# Patient Record
Sex: MALE | Race: WHITE | HISPANIC OR LATINO | Employment: OTHER | URBAN - METROPOLITAN AREA
[De-identification: names, ages, dates, MRNs, and addresses within clinical notes are randomized per-mention and may not be internally consistent; named-entity substitution may affect disease eponyms.]

---

## 2017-04-19 ENCOUNTER — ALLSCRIPTS OFFICE VISIT (OUTPATIENT)
Dept: OTHER | Facility: OTHER | Age: 62
End: 2017-04-19

## 2017-04-19 DIAGNOSIS — E78.5 HYPERLIPIDEMIA: ICD-10-CM

## 2017-04-19 DIAGNOSIS — I25.10 ATHEROSCLEROTIC HEART DISEASE OF NATIVE CORONARY ARTERY WITHOUT ANGINA PECTORIS: ICD-10-CM

## 2017-04-19 DIAGNOSIS — R94.31 ABNORMAL ELECTROCARDIOGRAM: ICD-10-CM

## 2017-04-19 DIAGNOSIS — I10 ESSENTIAL (PRIMARY) HYPERTENSION: ICD-10-CM

## 2017-05-09 ENCOUNTER — TRANSCRIBE ORDERS (OUTPATIENT)
Dept: ADMINISTRATIVE | Facility: HOSPITAL | Age: 62
End: 2017-05-09

## 2017-05-09 DIAGNOSIS — I10 BENIGN HYPERTENSION: Primary | ICD-10-CM

## 2017-05-09 DIAGNOSIS — R94.31 ABNORMAL ELECTROCARDIOGRAM: ICD-10-CM

## 2017-06-08 ENCOUNTER — HOSPITAL ENCOUNTER (OUTPATIENT)
Dept: NON INVASIVE DIAGNOSTICS | Facility: CLINIC | Age: 62
Discharge: HOME | End: 2017-06-08
Payer: COMMERCIAL

## 2017-06-08 DIAGNOSIS — I10 BENIGN HYPERTENSION: ICD-10-CM

## 2017-06-08 DIAGNOSIS — R94.31 ABNORMAL ELECTROCARDIOGRAM: ICD-10-CM

## 2017-06-08 DIAGNOSIS — I10 ESSENTIAL (PRIMARY) HYPERTENSION: ICD-10-CM

## 2017-06-08 DIAGNOSIS — I25.10 ATHEROSCLEROTIC HEART DISEASE OF NATIVE CORONARY ARTERY WITHOUT ANGINA PECTORIS: ICD-10-CM

## 2017-06-08 DIAGNOSIS — E78.5 HYPERLIPIDEMIA: ICD-10-CM

## 2017-06-08 LAB
CHEST PAIN STATEMENT: NORMAL
MAX DIASTOLIC BP: 82 MMHG
MAX HEART RATE: 112 BPM
MAX PREDICTED HEART RATE: 157 BPM
MAX. SYSTOLIC BP: 188 MMHG
PROTOCOL NAME: NORMAL
TARGET HR FORMULA: NORMAL
TEST INDICATION: NORMAL
TIME IN EXERCISE PHASE: 480 S

## 2017-06-08 PROCEDURE — A9502 TC99M TETROFOSMIN: HCPCS

## 2017-06-08 PROCEDURE — 78452 HT MUSCLE IMAGE SPECT MULT: CPT

## 2017-06-08 PROCEDURE — 93017 CV STRESS TEST TRACING ONLY: CPT

## 2018-01-15 VITALS
SYSTOLIC BLOOD PRESSURE: 150 MMHG | DIASTOLIC BLOOD PRESSURE: 60 MMHG | BODY MASS INDEX: 25.23 KG/M2 | HEART RATE: 68 BPM | HEIGHT: 66 IN | WEIGHT: 157 LBS

## 2018-04-04 ENCOUNTER — OFFICE VISIT (OUTPATIENT)
Dept: CARDIOLOGY CLINIC | Facility: MEDICAL CENTER | Age: 63
End: 2018-04-04
Payer: COMMERCIAL

## 2018-04-04 VITALS
BODY MASS INDEX: 25.25 KG/M2 | HEIGHT: 67 IN | WEIGHT: 160.9 LBS | OXYGEN SATURATION: 98 % | SYSTOLIC BLOOD PRESSURE: 116 MMHG | HEART RATE: 68 BPM | DIASTOLIC BLOOD PRESSURE: 64 MMHG

## 2018-04-04 DIAGNOSIS — Z95.1 HX OF CABG: ICD-10-CM

## 2018-04-04 DIAGNOSIS — Z95.5 S/P DRUG ELUTING CORONARY STENT PLACEMENT: ICD-10-CM

## 2018-04-04 DIAGNOSIS — I25.10 CORONARY ARTERY DISEASE INVOLVING NATIVE CORONARY ARTERY OF NATIVE HEART WITHOUT ANGINA PECTORIS: Primary | ICD-10-CM

## 2018-04-04 DIAGNOSIS — I10 ESSENTIAL HYPERTENSION: ICD-10-CM

## 2018-04-04 DIAGNOSIS — E78.5 HYPERLIPIDEMIA, UNSPECIFIED HYPERLIPIDEMIA TYPE: ICD-10-CM

## 2018-04-04 DIAGNOSIS — R94.31 ABNORMAL ECG: ICD-10-CM

## 2018-04-04 PROCEDURE — 99214 OFFICE O/P EST MOD 30 MIN: CPT | Performed by: INTERNAL MEDICINE

## 2018-04-04 RX ORDER — ASPIRIN 81 MG/1
1 TABLET, CHEWABLE ORAL DAILY
COMMUNITY

## 2018-04-04 RX ORDER — ATENOLOL 50 MG/1
50 TABLET ORAL DAILY
Qty: 90 TABLET | Refills: 3 | Status: SHIPPED | OUTPATIENT
Start: 2018-04-04 | End: 2019-11-18 | Stop reason: SDUPTHER

## 2018-04-04 RX ORDER — URSODIOL 300 MG/1
1 CAPSULE ORAL 2 TIMES DAILY
COMMUNITY
Start: 2015-09-14

## 2018-04-04 RX ORDER — LISINOPRIL 20 MG/1
20 TABLET ORAL DAILY
COMMUNITY
Start: 2012-04-18 | End: 2018-06-12 | Stop reason: CLARIF

## 2018-04-04 RX ORDER — ATENOLOL 50 MG/1
1 TABLET ORAL DAILY
COMMUNITY
Start: 2011-06-30 | End: 2018-04-04 | Stop reason: SDUPTHER

## 2018-04-04 RX ORDER — MYCOPHENOLATE MOFETIL 500 MG/1
1000 TABLET ORAL 2 TIMES DAILY
Refills: 0 | COMMUNITY
Start: 2018-03-01

## 2018-04-04 RX ORDER — PREDNISONE 1 MG/1
5 TABLET ORAL DAILY
Refills: 0 | COMMUNITY
Start: 2018-03-14 | End: 2018-10-15 | Stop reason: ALTCHOICE

## 2018-04-04 NOTE — PROGRESS NOTES
Follow-up - Cardiology   Starr Carrasquillo 57 y o  male MRN: [id number]        Problems    1  Coronary artery disease involving native coronary artery of native heart without angina pectoris     2  Hyperlipidemia, unspecified hyperlipidemia type     3  Abnormal ECG     4  Essential hypertension     5  S/P drug eluting coronary stent placement     6  Hx of CABG         Plan    I had the pleasure of having Fredy Becerrils return to see me at the [facility]  He recently came down with pneumonia, significant coughing and some musculoskeletal chest discomfort was likely  Symptoms are atypical for CAD  He had a stress test 6/17 which was normal       CAD   remote history of CABG, and stenting, preserved LV function, stress test 6/17 normal   No recent coronary symptoms  Hypertension   controlled     hyperlipidemia   no statin therapy due to autoimmune hepatitis  Autoimmune hepatitis   remains on immunosuppressive therapy, he reports his disease is stable, but significant rise in his A1c due to prednisone and he tells me that down titration of his prednisone is planned  1 year follow up recommended  HPI: Starr Carrasquillo is a 57y o  year old male   With a history of remote CABG, prior coronary stent, preserved LV function, hypertension which has been well controlled on current medical therapy including atenolol and lisinopril, hyperlipidemia which unfortunately is only treated by lifestyle measures due to autoimmune hepatitis, and autoimmune hepatitis currently on immunosuppressive therapy including prednisone with a recent increase in his A1c 9 9  Prednisone down titration is planned  An J9B of 9 9 is certainly a significant cardiovascular risk  He did have some chest pain recently, but this follows pneumonia, and coughing was felt related to musculoskeletal discomfort by his PCP  He tells me his lipids have been under pretty reasonable control in the absence of statin therapy    He had a stress test 6/17 which revealed normal resolved after 8 minutes of exercise and was prompted by a slightly abnormal EKG in my office last year  Review of Systems   All other systems reviewed and are negative  History reviewed  No pertinent past medical history  History   Alcohol Use No     History   Drug Use No     History   Smoking Status    Former Smoker    Quit date: 2000   Smokeless Tobacco    Never Used       Allergies: Allergies   Allergen Reactions    Imuran [Azathioprine] Fever       Medications:     Current Outpatient Prescriptions:     aspirin 81 mg chewable tablet, Chew 1 tablet daily, Disp: , Rfl:     atenolol (TENORMIN) 50 mg tablet, Take 1 tablet by mouth daily, Disp: , Rfl:     Empagliflozin 25 MG TABS, Take 10 mg by mouth daily, Disp: , Rfl:     lisinopril (ZESTRIL) 20 mg tablet, Take 20 mg by mouth daily, Disp: , Rfl:     mycophenolate (CELLCEPT) 500 mg tablet, Take 1,000 mg by mouth 2 (two) times a day, Disp: , Rfl: 0    predniSONE 5 mg tablet, 7 5 mg daily, Disp: , Rfl: 0    ursodiol (ACTIGALL) 300 mg capsule, Take 1 capsule by mouth 2 (two) times a day, Disp: , Rfl:       Vitals:    04/04/18 1411   BP: 116/64   Pulse: 68   SpO2: 98%     Weight (last 2 days)     Date/Time   Weight    04/04/18 1411  73 (160 9)            Physical Exam   Constitutional: He is oriented to person, place, and time  No distress  HENT:   Head: Normocephalic and atraumatic  Eyes: Conjunctivae are normal  No scleral icterus  Neck: Normal range of motion  No JVD present  Cardiovascular: Normal rate, regular rhythm, normal heart sounds and intact distal pulses  No murmur heard  Pulmonary/Chest: Effort normal and breath sounds normal  He has no wheezes  He has no rales  Musculoskeletal: He exhibits no edema  Neurological: He is alert and oriented to person, place, and time  Skin: Skin is warm and dry  He is not diaphoretic           Laboratory Studies:  CMP:    NT-proBNP: No results found for: NTBNP   Coags:    Lipid Profile:   No results found for: CHOL  No results found for: HDL  No results found for: LDLCALC  No results found for: TRIG    Cardiac testing:      stress Myoview 6/17-8 minutes of exercise, borderline ST segment changes, no ischemic symptoms, no perfusion abnormality, normal ejection fraction    Chung Ross MD    Portions of the record may have been created with voice recognition software   Occasional wrong word or "sound a like" substitutions may have occurred due to the inherent limitations of voice recognition software   Read the chart carefully and recognize, using context, where substitutions have occurred

## 2018-04-23 ENCOUNTER — OFFICE VISIT (OUTPATIENT)
Dept: PULMONOLOGY | Facility: CLINIC | Age: 63
End: 2018-04-23
Payer: COMMERCIAL

## 2018-04-23 VITALS
HEIGHT: 67 IN | HEART RATE: 74 BPM | TEMPERATURE: 97.2 F | OXYGEN SATURATION: 97 % | RESPIRATION RATE: 16 BRPM | DIASTOLIC BLOOD PRESSURE: 70 MMHG | BODY MASS INDEX: 25.27 KG/M2 | SYSTOLIC BLOOD PRESSURE: 128 MMHG | WEIGHT: 161 LBS

## 2018-04-23 DIAGNOSIS — J84.9 ILD (INTERSTITIAL LUNG DISEASE) (HCC): Primary | ICD-10-CM

## 2018-04-23 PROCEDURE — 99244 OFF/OP CNSLTJ NEW/EST MOD 40: CPT | Performed by: INTERNAL MEDICINE

## 2018-04-23 NOTE — PROGRESS NOTES
Office Visit - Pulmonary Medicine   Mateusz Ballard 57 y o  male MRN: [id number]  Encounter: [id number]    Assessment/Plan:    Suspect IPF with or without COPD given bibasilar fibrosis reported as far back as 2014 on CT and CXR however cannot rule out autoimmune or medication related   Patient with good functional status  Obtain CT and PFTs with 6MW to assess the disease and review in clinic  Strongly counselled to quit smoking- patient wants to try on his own cold turkey this week- I have advised nicotine patches if cravings occur      Diagnoses and all orders for this visit:    ILD (interstitial lung disease) ([facility] )  -     CT chest high resolution; Future  -     Pulmonary function test; Future  -     Six minute walk; Future        All of his questions were answered prior to leaving the office today  He will follow-up with Dr Eva Wallace in 1 month  or sooner should the need arise  He is aware to call our office with any further questions or concerns  Subjective:    Reason for Visit: Abnormal CXR  Chief Complaint: None  Hx and PE limited by: Not limited  HPI: Mateusz Ballard is a 57 y o  male who presents to the office today   He was asked to see me because of abnormal CXRs showing bibasilar fibrosis  This goes back to CT of the abdomen in 2014  Jerryl Rm He feels well and is very active able to exercise atleast 5 miles a day and still working  No cough or wheeze or chest pain  Able to lie flat and sleep through the night  No ankle edema or  Palpitations  He is currently managed for autoimmune hepatitis at [facility] and has had immuran, mycophenolate and prednisone in the past but no methotrexate  No history of amiodarone use, chemotherapeutic agents or radiation  No occupational exposures ( 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 [employer] 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[employer])  No family history of ILD  No pet birds       Heavy smoker quit after CABG and resumed smoking last year 1 pack lasts him 2 weeks    Review of Systems     Review of systems-  No fever weight loss chills change in appetite constitutional symptoms  No headache blurry vision nausea vomiting diarrhea or weakness  No chest pain palpitations syncope shortness of breath orthopnea PND or wheezing or lower extremity swelling   no cough no discolored mucus or hemoptysis  No joint pains swelling or redness of an extremity  No hematuria dysuria urgency or frequency  No hematochezia melena or hematemesis  Rest of  ten point review of systems is normal    Historical Information    CAD  HL  Essential HTN  CABG / stenting stress test 6/17 normal , preserved LV function  Autoimmune hepatitis          No past surgical history on file  Social History   History   Alcohol Use No     History   Drug Use No     History   Smoking Status    Former Smoker    Quit date: 2000   Smokeless Tobacco    Never Used     Occupational History:  As above    Family History: non-contributory    Meds/Allergies   all current active meds have been reviewed    Allergies   Allergen Reactions    Imuran [Azathioprine] Fever       Objective   Vitals: Blood pressure 128/70, pulse 74, temperature (!) 97 2 °F (36 2 °C), resp  rate 16, height 5' 6 5" (1 689 m), weight 73 kg (161 lb), SpO2 97 %  ,Body mass index is 25 6 kg/m²  Physical Exam     Appears well in no respiratory distress, thinly built   HEENT: No JVD TM LNE  Cardiac - S1 S2 normal no MRG  Lungs- CTA  Anteriorly velcro bibasilar rales posteriorly   Chest wall inspection normal  Abdomen- soft ND NT normal BS  Neuro- alert oriented no focal neurological deficits  Skin- No rash  Joints- not enlarged red or deformed  Psych- mood and affect normal   LE- no cyanosis clubbing or edema       Lab Results:  None in system    Imaging Studies: I have personally reviewed pertinent reports         CXR from 3/19/18 scanned into system and previous CT in our system on PACS    EKG, Pathology, and Other Studies:  None in system Pulmonary Results (PFTs, PSG):  None in system     Portions of the record may have been created with voice recognition software  Occasional wrong word or "sound a like" substitutions may have occurred due to the inherent limitations of voice recognition software  Read the chart carefully and recognize, using context, where substitutions have occurred

## 2018-04-23 NOTE — PATIENT INSTRUCTIONS
Please attempt to quit smoking  Feel free to use nicotine patch if you have cravings  Continue to stay active and smoke free  We will see you back with Ct and PFTs

## 2018-05-09 ENCOUNTER — HOSPITAL ENCOUNTER (OUTPATIENT)
Dept: PULMONOLOGY | Facility: HOSPITAL | Age: 63
Discharge: HOME | End: 2018-05-09
Attending: INTERNAL MEDICINE
Payer: COMMERCIAL

## 2018-05-09 ENCOUNTER — HOSPITAL ENCOUNTER (OUTPATIENT)
Dept: CT IMAGING | Facility: HOSPITAL | Age: 63
Discharge: HOME | End: 2018-05-09
Attending: INTERNAL MEDICINE
Payer: COMMERCIAL

## 2018-05-09 DIAGNOSIS — J84.9 ILD (INTERSTITIAL LUNG DISEASE) (HCC): ICD-10-CM

## 2018-05-09 PROCEDURE — 94060 EVALUATION OF WHEEZING: CPT

## 2018-05-09 PROCEDURE — 71250 CT THORAX DX C-: CPT

## 2018-05-09 PROCEDURE — 94726 PLETHYSMOGRAPHY LUNG VOLUMES: CPT | Performed by: INTERNAL MEDICINE

## 2018-05-09 PROCEDURE — 94060 EVALUATION OF WHEEZING: CPT | Performed by: INTERNAL MEDICINE

## 2018-05-09 PROCEDURE — 94726 PLETHYSMOGRAPHY LUNG VOLUMES: CPT

## 2018-05-09 PROCEDURE — 94761 N-INVAS EAR/PLS OXIMETRY MLT: CPT

## 2018-05-09 PROCEDURE — 94729 DIFFUSING CAPACITY: CPT

## 2018-05-09 PROCEDURE — 94729 DIFFUSING CAPACITY: CPT | Performed by: INTERNAL MEDICINE

## 2018-05-09 RX ORDER — ALBUTEROL SULFATE 2.5 MG/3ML
2.5 SOLUTION RESPIRATORY (INHALATION) ONCE
Status: COMPLETED | OUTPATIENT
Start: 2018-05-09 | End: 2018-05-09

## 2018-05-09 RX ADMIN — ALBUTEROL SULFATE 2.5 MG: 2.5 SOLUTION RESPIRATORY (INHALATION) at 16:20

## 2018-06-12 ENCOUNTER — OFFICE VISIT (OUTPATIENT)
Dept: PULMONOLOGY | Facility: CLINIC | Age: 63
End: 2018-06-12
Payer: COMMERCIAL

## 2018-06-12 VITALS
TEMPERATURE: 97.5 F | HEIGHT: 66 IN | DIASTOLIC BLOOD PRESSURE: 50 MMHG | WEIGHT: 158 LBS | BODY MASS INDEX: 25.39 KG/M2 | SYSTOLIC BLOOD PRESSURE: 110 MMHG | OXYGEN SATURATION: 95 % | HEART RATE: 63 BPM

## 2018-06-12 DIAGNOSIS — J84.9 INTERSTITIAL LUNG DISEASE (HCC): Primary | ICD-10-CM

## 2018-06-12 PROBLEM — F17.201 TOBACCO ABUSE, IN REMISSION: Status: ACTIVE | Noted: 2018-06-12

## 2018-06-12 PROBLEM — K75.4 AUTOIMMUNE HEPATITIS (HCC): Status: ACTIVE | Noted: 2018-06-05

## 2018-06-12 PROBLEM — J43.8 OTHER EMPHYSEMA (HCC): Status: ACTIVE | Noted: 2018-06-12

## 2018-06-12 PROCEDURE — 99215 OFFICE O/P EST HI 40 MIN: CPT | Performed by: INTERNAL MEDICINE

## 2018-06-12 RX ORDER — CIPROFLOXACIN 500 MG/1
500 TABLET, FILM COATED ORAL
COMMUNITY
End: 2019-07-15 | Stop reason: ALTCHOICE

## 2018-06-12 RX ORDER — METRONIDAZOLE 500 MG/1
500 TABLET ORAL
COMMUNITY
End: 2018-06-12

## 2018-06-12 RX ORDER — LISINOPRIL 5 MG/1
5 TABLET ORAL DAILY
COMMUNITY
End: 2019-07-15 | Stop reason: ALTCHOICE

## 2018-06-12 NOTE — PROGRESS NOTES
Pulmonary Follow Up Note   Jhony Medina 70 y o  male MRN: [id number]  6/12/2018      Assessment:  1  Interstitial Lung Disease  · Undetermined etiology - however appeared present back in 2004  · Suspect autoimmune etiology given autoimmune hepatitis  Time course and radiographic pattern not typical for IPF, possible chronic HP, NSIP, atypical sarcoid, etc  · Moderate restriction on spirometry, mild reduction in TLC with no exertional hypoxia or dyspnea symptoms  · Plan repeat serology evaluation now as listed  · Repeat 6MWT and spirometry in office in 5 months  · Determine timing of repeat chest imaging based upon results - would recommend at least yearly given tobacco abuse history  · Review vaccine status at next visit    2  Emphysema on radiographs - no symptoms, monitor as listed above    3  Former tobacco abuse - strongly encouraged to remain completely tobacco free    4  Autoimmune hepatitis    Plan:    Diagnoses and all orders for this visit:    Interstitial lung disease ([facility] )  -     AN Screen w/ Reflex to Titer/Pattern; Future  -     Anti-neutrophilic cytoplasmic antibody; Future  -     Anti-scleroderma antibody; Future  -     Hypersensitivity pnuemonitis profile; Future  -     RF Screen w/ Reflex to Titer; Future  -     Anti-DNA antibody, double-stranded; Future  -     Cyclic citrul peptide antibody, IgG; Future  -     IgE; Future  -     Sjogren's Antibodies; Future  -     Alpha 1 Antitrypsin Phenotype; Future  -     Alpha-1-antitrypsin; Future    Other orders  -     ciprofloxacin (CIPRO) 500 mg tablet; Take 500 mg by mouth  -     Discontinue: metroNIDAZOLE (FLAGYL) 500 mg tablet; Take 500 mg by mouth  -     lisinopril (ZESTRIL) 5 mg tablet; Take 5 mg by mouth daily        Return in about 5 months (around 11/12/2018) for Recheck  History of Present Illness   HPI:  Jhony Medina is a 70 y o  male who presents for ILD follow up    He has history of autoimmune hepatitis on prednisone/cellcept and prior imuran therapy, CAD post CABG, and prior tobacco abuse  He was seen initially by Dr Moy Coronel and PFTs and HRCT were ordered  He presents today for follow up  He reports feeling well  He continues to walk 5 miles per day and denied any dyspnea  He denied cough, no sputum production, no hemoptysis, no pleurisy,no adenopathy, no weight loss, no arthralgias, no myalgias, no wheeze, no inhaler use, no aspiration events, no dysphagia  He has quit smoking since his last visit    Prior History  - former smoker was 1-2 ppd for many years, quit 2018  - 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  - Prior [address] - multiple animal exposures including 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  - no current animal exposures, no exotic hobbies  Review of Systems   Constitutional: Negative for activity change, chills, fever and unexpected weight change  HENT: Negative for congestion, postnasal drip, rhinorrhea, trouble swallowing and voice change  Eyes: Negative for pain, redness and visual disturbance  Respiratory: Negative for cough, chest tightness, shortness of breath and wheezing  Cardiovascular: Negative for chest pain, palpitations and leg swelling  Gastrointestinal: Negative for abdominal distention, abdominal pain, diarrhea, nausea and vomiting  Endocrine: Negative for cold intolerance and heat intolerance  Genitourinary: Negative for dysuria, frequency and urgency  Musculoskeletal: Negative for arthralgias and myalgias  Skin: Negative for color change, rash and wound  Neurological: Negative for dizziness, syncope, weakness and light-headedness  Hematological: Negative for adenopathy  Psychiatric/Behavioral: Negative for confusion and sleep disturbance         Historical Information   Past Medical History:   Diagnosis Date    [facility]     Diabetes mellitus ([facility] )     Type 2     Pneumonia 2018     Past Surgical History:   Procedure Laterality Date    ARTERIAL BYPASS SURGERY      ROTATOR CUFF REPAIR       Family History   Problem Relation Age of Onset    Heart attack Mother     Heart disease Brother     Hypertension Brother     Heart disease Brother     Heart disease Brother     COPD Brother          Meds/Allergies     Current Outpatient Prescriptions:     aspirin 81 mg chewable tablet, Chew 1 tablet daily, Disp: , Rfl:     atenolol (TENORMIN) 50 mg tablet, Take 1 tablet (50 mg total) by mouth daily, Disp: 90 tablet, Rfl: 3    ciprofloxacin (CIPRO) 500 mg tablet, Take 500 mg by mouth, Disp: , Rfl:     lisinopril (ZESTRIL) 5 mg tablet, Take 5 mg by mouth daily, Disp: , Rfl:     mycophenolate (CELLCEPT) 500 mg tablet, Take 1,000 mg by mouth 2 (two) times a day, Disp: , Rfl: 0    predniSONE 5 mg tablet, 5 mg daily  , Disp: , Rfl: 0    ursodiol (ACTIGALL) 300 mg capsule, Take 1 capsule by mouth 2 (two) times a day, Disp: , Rfl:     Empagliflozin 25 MG TABS, Take 10 mg by mouth daily, Disp: , Rfl:   Allergies   Allergen Reactions    Imuran [Azathioprine] Fever       Vitals: Blood pressure 110/50, pulse 63, temperature 97 5 °F (36 4 °C), temperature source Tympanic, height 5' 6" (1 676 m), weight 71 7 kg (158 lb), SpO2 95 %  Body mass index is 25 5 kg/m²  Oxygen Therapy  SpO2: 95 %  Oxygen Therapy: None (Room air)      Physical Exam  Physical Exam   Constitutional: He is oriented to person, place, and time  He appears well-developed and well-nourished  HENT:   Head: Normocephalic and atraumatic  Right Ear: External ear normal    Left Ear: External ear normal    Nose: Nose normal    Mouth/Throat: Oropharynx is clear and moist  No oropharyngeal exudate  Eyes: Conjunctivae are normal  Pupils are equal, round, and reactive to light  Right eye exhibits no discharge  Left eye exhibits no discharge  No scleral icterus  Neck: Neck supple  No JVD present  No tracheal deviation present  Cardiovascular: Normal rate, regular rhythm and normal heart sounds  No murmur heard  Pulmonary/Chest: Effort normal  No stridor  No respiratory distress  He has no wheezes  He has rales  Very mild holoinspiratory rales on right base, no wheeze, no rhonchi, no pleural rubs   Abdominal: Soft  Bowel sounds are normal  He exhibits no distension  There is no tenderness  Musculoskeletal: He exhibits no edema or deformity  Lymphadenopathy:     He has no cervical adenopathy  Neurological: He is alert and oriented to person, place, and time  Skin: Skin is warm and dry  No rash noted  Psychiatric: He has a normal mood and affect  His behavior is normal    Vitals reviewed  Labs: I have personally reviewed pertinent lab results  Lab Results   Component Value Date    WBC 6 96 2014    HGB 15 8 2014    HCT 44 7 2014    MCV 90 2014     (L) 2014     Lab Results   Component Value Date    GLUCOSE 411 (H) 2014    CALCIUM 9 4 2014     (L) 2014    K 4 0 2014    CO2 29 2014    CL 98 (L) 2014    BUN 10 2014    CREATININE 0 53 (L) 2014     No results found for: IGE  Lab Results   Component Value Date     (H) 2014     (H) 2014    ALKPHOS 67 2014    BILITOT 0 76 2014       Imaging and other studies: I have personally reviewed pertinent reports  and I have personally reviewed pertinent films in PACS  HRCT Chest 18 - noted peripheral honeycomb and peripheral subpleural thickening with noted upper lobe predominance on right and some basilar honeycombing, underlying upper lobe paraseptal emphysematous changes - no effusions, no sig mediastinal adenopathy - mild progression from 2013 radiograph - of note - CT chest  with noted upper lobe paraseptal emphysematous changes,mild honeycombing and more of a basilar peripheral GGO      Pulmonary function testin/9/18 - Ratio 78%, FVC 2 61L (67%), FEV1 2 04L (68%), no change post BD, TLC 73%, RV 89%, DLCO 49% - moderate restrictive airflow defect, mildly reduced TLC, no change with BD, moderately reduced diffusion    5/9/18 - 6MWT on room air - total walk distance 401  8meters, Initial SpO2 97%, tayler 96%, conclusion 97%, maximal HR 87bpm, Brittany dyspnea Scale 0-->0      Loly Rapp DO, [facility]

## 2018-06-12 NOTE — PATIENT INSTRUCTIONS
· Get labs drawn with next hepatology labs  · Follow up in 4-5 months with repeat breathing and walking tests in the office

## 2018-10-12 LAB
A FUMIGATUS1 AB SER QL ID: NEGATIVE
A FUMIGATUS6 AB SER QL ID: NEGATIVE
A PULLULANS AB SER QL: NEGATIVE
A1AT PHENOTYP SERPL IFE: NORMAL
A1AT SERPL-MCNC: 145 MG/DL (ref 90–200)
ANA TITR SER IF: NEGATIVE {TITER}
C-ANCA TITR SER IF: NORMAL TITER
CCP IGA+IGG SERPL IA-ACNC: 7 UNITS (ref 0–19)
DSDNA AB SER-ACNC: 1 IU/ML (ref 0–9)
ENA SCL70 AB SER-ACNC: <0.2 AI (ref 0–0.9)
ENA SS-A AB SER-ACNC: <0.2 AI (ref 0–0.9)
ENA SS-B AB SER-ACNC: <0.2 AI (ref 0–0.9)
IGE SERPL-ACNC: 92 IU/ML (ref 0–100)
P-ANCA ATYPICAL TITR SER IF: NORMAL TITER
P-ANCA TITR SER IF: NORMAL TITER
PIGEON SERUM AB QL ID: NEGATIVE
RHEUMATOID FACT SERPL-ACNC: <10 IU/ML (ref 0–13.9)
S RECTIVIRGULA AB SER QL ID: NEGATIVE
S VIRIDIS AB SER-ACNC: NEGATIVE
T CANDIDUS AB SER QL: NEGATIVE
THERMOACTINOMYCES VULGARIS AB [UNITS/VOLUME] IN SERUM: NEGATIVE

## 2018-10-15 ENCOUNTER — OFFICE VISIT (OUTPATIENT)
Dept: PULMONOLOGY | Facility: CLINIC | Age: 63
End: 2018-10-15
Payer: COMMERCIAL

## 2018-10-15 VITALS
SYSTOLIC BLOOD PRESSURE: 130 MMHG | HEIGHT: 66 IN | TEMPERATURE: 97.1 F | WEIGHT: 162 LBS | RESPIRATION RATE: 18 BRPM | OXYGEN SATURATION: 96 % | DIASTOLIC BLOOD PRESSURE: 68 MMHG | HEART RATE: 65 BPM | BODY MASS INDEX: 26.03 KG/M2

## 2018-10-15 DIAGNOSIS — K75.4 AUTOIMMUNE HEPATITIS (HCC): ICD-10-CM

## 2018-10-15 DIAGNOSIS — Z72.0 TOBACCO ABUSE: ICD-10-CM

## 2018-10-15 DIAGNOSIS — J84.9 INTERSTITIAL LUNG DISEASE (HCC): Primary | ICD-10-CM

## 2018-10-15 PROCEDURE — 94618 PULMONARY STRESS TESTING: CPT | Performed by: INTERNAL MEDICINE

## 2018-10-15 PROCEDURE — 99215 OFFICE O/P EST HI 40 MIN: CPT | Performed by: INTERNAL MEDICINE

## 2018-10-15 PROCEDURE — 94010 BREATHING CAPACITY TEST: CPT | Performed by: INTERNAL MEDICINE

## 2018-10-15 RX ORDER — PREDNISONE 20 MG/1
5 TABLET ORAL DAILY
COMMUNITY

## 2018-10-15 RX ORDER — GLIPIZIDE 10 MG/1
20 TABLET, FILM COATED, EXTENDED RELEASE ORAL DAILY
COMMUNITY

## 2018-10-15 NOTE — PATIENT INSTRUCTIONS
· You need to completely quit smoking  · Follow up in 6 months and get CT chest prior to your visit  · Call for any increased shortness of breath or new chest symptoms    How to Stop Smoking   AMBULATORY CARE:   You will improve your health and the health of others around you  if you stop smoking  Your risk for heart and lung disease, cancer, stroke, heart attack, and vision problems will also decrease  You can benefit from quitting no matter how long you have smoked  Prepare to stop smoking:  Nicotine is a highly addictive drug found in cigarettes  Withdrawal symptoms can happen when you stop smoking and make it hard to quit  These include anxiety, depression, irritability, trouble sleeping, and increased appetite  You increase your chances of success if you prepare to quit  · Set a quit date  [place]al [place] a date that is within the next 2 weeks  Do not pick a day that you think may be stressful or busy  Write down the day or [employer] it on your calender  · Tell friends and family that you plan to quit  Explain that you may have withdrawal symptoms when you try to quit  Ask them to support you  They may be able to encourage you and help reduce your stress to make it easier for you to quit  · Make a list of your reasons for quitting  Put the list somewhere you will see it every day, such as your refrigerator  You can look at the list when you have a craving  · Remove all tobacco and nicotine products from your home, car, and workplace  Also, remove anything else that will tempt you to smoke, such as lighters, matches, or ashtrays  Clean your car, home, and places at work that smell like smoke  The smell of smoke can trigger a craving  · Identify triggers that make you want to smoke  This may include activities, feelings, or people  Also write down 1 way you can deal with each of your triggers   For example, if you want to smoke as soon as you wake up, plan another activity during this time, such as exercise  · Make a plan for how you will quit  Learn about the tools that can help you quit, such as medicine, counseling, or nicotine replacement therapy  Choose at least 2 options to help you quit  Tools to help you stop smoking:   · Counseling  from a trained healthcare provider can provide you with support and skills to quit smoking  The provider will also teach you to manage your withdrawal symptoms and cravings  You may receive counseling from one counselor, in group therapy, or through phone therapy called a quit line  · Nicotine replacement therapy (NRT)  such as nicotine patches, gum, or lozenges may help reduce your nicotine cravings  You may get these without a doctor's order  Do not use e-cigarettes or smokeless tobacco in place of cigarettes or to help you quit  They still contain nicotine  · Prescription medicines  such as nasal sprays or nicotine inhalers may help reduce your withdrawal symptoms  Other medicines may also be used to reduce your urge to smoke  Ask your healthcare provider about these medicines  You may need to start certain medicines 2 weeks before your quit date for them to work well  · Hypnosis  is a practice that helps guide you through thoughts and feelings  Hypnosis may help decrease your cravings and make you more willing to quit  · Acupuncture therapy  uses very thin needles to balance energy channels in the body  This is thought to help decrease cravings and symptoms of nicotine withdrawal      · Support groups  let you talk to others who are trying to quit or have already quit  It may be helpful to speak with others about how they quit  Manage your cravings:   · Avoid situations, people, and places that tempt you to smoke  Go to nonsmoking places, such as libraries or restaurants  Understand what tempts you and try to avoid these things  · Keep your hands busy  Hold things such as a stress ball or pen  · Put candy or toothpicks in your mouth  Keep lollipops, sugarless gum, or toothpicks with you at all times  · Do not have alcohol or caffeine  These drinks may tempt you to smoke  Drink healthy liquids such as water or juice instead  · Reward yourself when you resist your cravings  Rewards will motivate you and help you stay positive  · Do an activity that distracts you from your craving  Examples include going for a walk, exercising, or cleaning  Prevent weight gain after you quit:  You may gain a few pounds after you quit smoking  It is healthier for you to gain a few pounds than to continue to smoke  The following can help you prevent weight gain:  · Eat healthy foods  These include fruits, vegetables, whole-grain breads, low-fat dairy products, beans, lean meats, and fish  Eat healthy snacks, such as low-fat yogurt, if you get hungry between meals  · Drink water before, during, and between meals  This will make your stomach feel full and help prevent you from overeating  Ask your healthcare provider how much liquid to drink each day and which liquids are best for you  · Exercise  Take a walk or do some kind of exercise every day  Ask your healthcare provider what exercise is right for you  This may help reduce your cravings and reduce stress  For more support and information:   · Smokefree  gov  Phone: 7- 848 - 538-5037  Web Address: www smokefree  gov  © 2017 [address] Information is for End User's use only and may not be sold, redistributed or otherwise used for commercial purposes  All illustrations and images included in CareNotes® are the copyrighted property of [employer]  or Sumit Reveles  The above information is an  only  It is not intended as medical advice for individual conditions or treatments  Talk to your doctor, nurse or pharmacist before following any medical regimen to see if it is safe and effective for you

## 2018-10-15 NOTE — PROGRESS NOTES
Pulmonary Follow Up Note   Raul Whitaker 70 y o  male MRN: [id number]  10/15/2018      Assessment:  1  Interstitial Lung Disease  · Undetermined etiology - however appeared present back in 2004  · Continue to suspect autoimmune etiology given autoimmune hepatitis  Time course and radiographic pattern not typical for IPF  · Serology panels negative 10/2018  · Moderate restriction on spirometry, mild reduction in TLC with no exertional hypoxia or dyspnea symptoms  · Discussed that current treatment with cellcept would be in concordance with non IPF ILD  · Repeat 6MWT and spirometry in office in 6 months  · Repeat HRCT in 6 months  · He reports he has obtained flu vaccine this season and previously had pneumovax    2  Emphysema on radiographs - no symptoms, monitor as listed above    3  Tobacco abuse - again advised need for complete tobacco cessation  Nonpharmacologic adjuncts and methods discussed  Could consider nicotine lozenges or gum for urges    4  Autoimmune hepatitis - per gastroenterology    Plan:    Diagnoses and all orders for this visit:    Interstitial lung disease ([facility] )  -     Six minute walk  -     POCT spirometry  -     CT chest high resolution; Future    Autoimmune hepatitis ([facility] )    Tobacco abuse    Other orders  -     predniSONE 10 mg tablet; Take 30 mg by mouth daily  -     glipiZIDE (GLUCOTROL XL) 10 mg 24 hr tablet; Take 20 mg by mouth daily  -     Empagliflozin (JARDIANCE) 10 MG TABS; Take 20 mg by mouth every morning        Return in about 6 months (around 4/15/2019) for Recheck  History of Present Illness   HPI:  Raul Whitaker is a 70 y o  male who presents for ILD follow up  He has history of autoimmune hepatitis on prednisone/cellcept and prior imuran therapy, CAD post CABG, and prior tobacco abuse  He was seen initially by Dr Eleazar Velazco and PFTs and HRCT were ordered  He was last seen by me in June 2018 and plans for serology testing were made        He reports he has overall felt well  He denied any changes in dyspnea and actually reported being able to do more than at last visit  He denied cough or sputum production, no hemoptysis, no pleurisy,no arthralgias, no myalgias, no rashes  He did report to smoking 3 cigarettes a day but has plan to wean off fully by the end of the month  He has continued on cellcept and started prednisone last week by Dr Chester Tompkins - BALJIT for his autoimmune hepatitis  Prior History  - former smoker was 1-2 ppd for many years, now 2-3 cig per day  - 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  - Prior [address] - multiple animal exposures including 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  - no current animal exposures, no exotic hobbies  Review of Systems   Constitutional: Negative for activity change, chills, fever and unexpected weight change  HENT: Negative for congestion, postnasal drip, rhinorrhea, trouble swallowing and voice change  Eyes: Negative for pain, redness and visual disturbance  Respiratory: Negative for cough, chest tightness, shortness of breath and wheezing  Cardiovascular: Negative for chest pain, palpitations and leg swelling  Gastrointestinal: Negative for abdominal distention, abdominal pain, diarrhea, nausea and vomiting  Endocrine: Negative for cold intolerance and heat intolerance  Genitourinary: Negative for dysuria, frequency, hematuria and urgency  Musculoskeletal: Negative for arthralgias and myalgias  Skin: Negative for color change and wound  Allergic/Immunologic: Positive for immunocompromised state  Neurological: Negative for dizziness, syncope, weakness and light-headedness  Hematological: Negative for adenopathy  Psychiatric/Behavioral: Negative for confusion and sleep disturbance         Historical Information   Past Medical History:   Diagnosis Date    Autoimmune hepatitis ([facility] )     Diabetes mellitus ([facility] ) Type 2     Pneumonia 2018     Past Surgical History:   Procedure Laterality Date    ARTERIAL BYPASS SURGERY      ROTATOR CUFF REPAIR       Family History   Problem Relation Age of Onset    Heart attack Mother     Heart disease Brother     Hypertension Brother     Heart disease Brother     Heart disease Brother     COPD Brother          Meds/Allergies     Current Outpatient Prescriptions:     aspirin 81 mg chewable tablet, Chew 1 tablet daily, Disp: , Rfl:     atenolol (TENORMIN) 50 mg tablet, Take 1 tablet (50 mg total) by mouth daily, Disp: 90 tablet, Rfl: 3    Empagliflozin (JARDIANCE) 10 MG TABS, Take 20 mg by mouth every morning, Disp: , Rfl:     glipiZIDE (GLUCOTROL XL) 10 mg 24 hr tablet, Take 20 mg by mouth daily, Disp: , Rfl:     lisinopril (ZESTRIL) 5 mg tablet, Take 5 mg by mouth daily, Disp: , Rfl:     mycophenolate (CELLCEPT) 500 mg tablet, Take 1,000 mg by mouth 2 (two) times a day, Disp: , Rfl: 0    predniSONE 10 mg tablet, Take 30 mg by mouth daily, Disp: , Rfl:     ursodiol (ACTIGALL) 300 mg capsule, Take 1 capsule by mouth 2 (two) times a day, Disp: , Rfl:     ciprofloxacin (CIPRO) 500 mg tablet, Take 500 mg by mouth, Disp: , Rfl:   Allergies   Allergen Reactions    Imuran [Azathioprine] Fever and Myalgia       Vitals: Blood pressure 130/68, pulse 65, temperature (!) 97 1 °F (36 2 °C), temperature source Tympanic, resp  rate 18, height 5' 6" (1 676 m), weight 73 5 kg (162 lb), SpO2 96 %  Body mass index is 26 15 kg/m²  Oxygen Therapy  SpO2: 96 % (ROOM AIR)      Physical Exam  Physical Exam   Constitutional: He is oriented to person, place, and time  He appears well-developed and well-nourished  HENT:   Head: Normocephalic and atraumatic  Right Ear: External ear normal    Left Ear: External ear normal    Nose: Nose normal    Mouth/Throat: Oropharynx is clear and moist  No oropharyngeal exudate  Eyes: Pupils are equal, round, and reactive to light   Conjunctivae are normal  Right eye exhibits no discharge  Left eye exhibits no discharge  No scleral icterus  Neck: Neck supple  No JVD present  No tracheal deviation present  Cardiovascular: Normal rate, regular rhythm and normal heart sounds  No murmur heard  Pulmonary/Chest: Effort normal  No stridor  No respiratory distress  He has no wheezes  He has rales  trace holoinspiratory rales on right base, no wheeze, no rhonchi, no pleural rubs   Abdominal: Soft  Bowel sounds are normal  He exhibits no distension  There is no tenderness  Musculoskeletal: He exhibits no edema or deformity  Lymphadenopathy:     He has no cervical adenopathy  Neurological: He is alert and oriented to person, place, and time  Skin: Skin is warm and dry  No rash noted  Psychiatric: He has a normal mood and affect  His behavior is normal    Vitals reviewed  Labs: I have personally reviewed pertinent lab results  Lab Results   Component Value Date    WBC 6 96 05/28/2014    HGB 15 8 05/28/2014    HCT 44 7 05/28/2014    MCV 90 05/28/2014     (L) 05/28/2014     Lab Results   Component Value Date    GLUCOSE 411 (H) 05/28/2014    CALCIUM 9 4 05/28/2014     (L) 05/28/2014    K 4 0 05/28/2014    CO2 29 05/28/2014    CL 98 (L) 05/28/2014    BUN 10 05/28/2014    CREATININE 0 53 (L) 05/28/2014     No results found for: IGE  Lab Results   Component Value Date     (H) 05/28/2014     (H) 05/28/2014    ALKPHOS 67 05/28/2014    BILITOT 0 76 05/28/2014     Care Everywhere labs reviewed LVH - AST 42, ALT 67    10/8/18 - [facility] Testing  RF, CCP, SCL70, AN, DS DNA, SSa, SSb, ANCA panel, and HP panel negative  IgE 92  A1AT 145, MM phenotype    Imaging and other studies: I have personally reviewed pertinent reports     and I have personally reviewed pertinent films in PACS  HRCT Chest 5/9/18 - noted peripheral honeycomb and peripheral subpleural thickening with noted upper lobe predominance on right and some basilar honeycombing, underlying upper lobe paraseptal emphysematous changes - no effusions, no sig mediastinal adenopathy - mild progression from 2013 radiograph - of note - CT chest 2004 with noted upper lobe paraseptal emphysematous changes,mild honeycombing and more of a basilar peripheral GGO  Pulmonary function testing:   10/15/18 - Ratio 71%, FVC 2 76L (70%), FEV1 1 97L (67%) - mild restrictive airflow defect    5/9/18 - Ratio 78%, FVC 2 61L (67%), FEV1 2 04L (68%), no change post BD, TLC 73%, RV 89%, DLCO 49% - moderate restrictive airflow defect, mildly reduced TLC, no change with BD, moderately reduced diffusion    10/15/18 - 6MWT on RA - total walk distance 414 meters, initial SpP2 97%, tayler 96%, initial HR 77bpm, maximal HR 88bpm    5/9/18 - 6MWT on room air - total walk distance 401  8meters, Initial SpO2 97%, tayler 96%, conclusion 97%, maximal HR 87bpm, Brittany dyspnea Scale 0-->0      Katya Rapp [facility]

## 2018-12-15 LAB — HBA1C MFR BLD HPLC: 10 %

## 2019-01-07 ENCOUNTER — TELEPHONE (OUTPATIENT)
Dept: CARDIOLOGY CLINIC | Facility: CLINIC | Age: 64
End: 2019-01-07

## 2019-01-07 NOTE — TELEPHONE ENCOUNTER
I doubt either are involved, but inform him that I do not mind if he skips atenolol for 4-5 days, and assesses whether or not it is causing his symptoms  If it does not help, then have him restart the atenolol, and stop the lisinopril for 4 to 5 days and assess whether or not it improves the symptoms   A little trial and ever never hurt

## 2019-01-07 NOTE — TELEPHONE ENCOUNTER
Melvi Cranker last seen in [facility], and is a one year f/u  He called today, asking to speak with you  He is taking lisinopril and atenolol and thinks one or both of them are giving him stomach cramps  From what I can see, has been taking both for about two years  He said he has had the stomach cramps for months , and no one is able to figure out why, so he looked up side effects of meds  I told him you were rounding in the hospital, but he asked to speak to you  Please advise      Pt cb: [phone]

## 2019-01-21 ENCOUNTER — TELEPHONE (OUTPATIENT)
Dept: CARDIOLOGY CLINIC | Facility: MEDICAL CENTER | Age: 64
End: 2019-01-21

## 2019-01-23 DIAGNOSIS — Z95.5 S/P DRUG ELUTING CORONARY STENT PLACEMENT: Primary | ICD-10-CM

## 2019-01-23 RX ORDER — WEIGH SCALE
MISCELLANEOUS MISCELLANEOUS WEEKLY
Qty: 1 EACH | Refills: 0 | Status: SHIPPED | OUTPATIENT
Start: 2019-01-23

## 2019-04-03 ENCOUNTER — TELEPHONE (OUTPATIENT)
Dept: CARDIOLOGY CLINIC | Facility: MEDICAL CENTER | Age: 64
End: 2019-04-03

## 2019-04-12 ENCOUNTER — TELEPHONE (OUTPATIENT)
Dept: PULMONOLOGY | Facility: CLINIC | Age: 64
End: 2019-04-12

## 2019-04-23 ENCOUNTER — TELEPHONE (OUTPATIENT)
Dept: PULMONOLOGY | Facility: CLINIC | Age: 64
End: 2019-04-23

## 2019-05-02 ENCOUNTER — HOSPITAL ENCOUNTER (OUTPATIENT)
Dept: CT IMAGING | Facility: HOSPITAL | Age: 64
Discharge: HOME | End: 2019-05-02
Attending: INTERNAL MEDICINE
Payer: COMMERCIAL

## 2019-05-02 DIAGNOSIS — J84.9 INTERSTITIAL LUNG DISEASE (HCC): ICD-10-CM

## 2019-05-02 PROCEDURE — 71250 CT THORAX DX C-: CPT

## 2019-07-15 ENCOUNTER — OFFICE VISIT (OUTPATIENT)
Dept: CARDIOLOGY CLINIC | Facility: MEDICAL CENTER | Age: 64
End: 2019-07-15
Payer: COMMERCIAL

## 2019-07-15 VITALS
BODY MASS INDEX: 25.38 KG/M2 | HEART RATE: 83 BPM | SYSTOLIC BLOOD PRESSURE: 142 MMHG | DIASTOLIC BLOOD PRESSURE: 66 MMHG | HEIGHT: 66 IN | OXYGEN SATURATION: 94 % | WEIGHT: 157.9 LBS

## 2019-07-15 DIAGNOSIS — I10 BENIGN ESSENTIAL HYPERTENSION: ICD-10-CM

## 2019-07-15 DIAGNOSIS — E78.5 HYPERLIPIDEMIA, UNSPECIFIED HYPERLIPIDEMIA TYPE: ICD-10-CM

## 2019-07-15 DIAGNOSIS — Z95.5 S/P DRUG ELUTING CORONARY STENT PLACEMENT: ICD-10-CM

## 2019-07-15 DIAGNOSIS — Z95.1 HX OF CABG: ICD-10-CM

## 2019-07-15 DIAGNOSIS — I25.10 CORONARY ARTERY DISEASE INVOLVING NATIVE CORONARY ARTERY OF NATIVE HEART WITHOUT ANGINA PECTORIS: Primary | ICD-10-CM

## 2019-07-15 DIAGNOSIS — I10 ESSENTIAL HYPERTENSION: ICD-10-CM

## 2019-07-15 PROCEDURE — 93000 ELECTROCARDIOGRAM COMPLETE: CPT | Performed by: INTERNAL MEDICINE

## 2019-07-15 PROCEDURE — 99214 OFFICE O/P EST MOD 30 MIN: CPT | Performed by: INTERNAL MEDICINE

## 2019-07-15 NOTE — PROGRESS NOTES
Follow-up - Cardiology   Berenice Muse 75 y o  male MRN: [id number]        Problems    1  Coronary artery disease involving native coronary artery of native heart without angina pectoris  POCT ECG   2  Essential hypertension  POCT ECG   3  Hyperlipidemia, unspecified hyperlipidemia type     4  Hx of CABG     5  Benign essential hypertension     6  S/P drug eluting coronary stent placement         Plan    I had the pleasure of having Mago Stauffer return to see me at the [facility]  He had lower chest discomfort in the spring, turned out it was probably his gallbladder  He changed his diet, his symptoms have resolved  He had no exertional components, and was very atypical for a cardiac presentation  CAD  Remote history of CABG and stenting  Normal LV function  No active ischemic symptoms  Last stress test 6/17 was normal      Hypertension  Slightly elevated on today's visit, now off of lisinopril since developing intolerance in the spring of 2019, but he assures me it every other doctor's office he has had normal blood pressures  hyperlipidemia  No statin therapy due to autoimmune hepatitis      Autoimmune hepatitis  Continues on immunosuppressive therapy, follows closely with hepatology    I will continue an annual visit with him, no medication changes at this time, but will obtain his lipid results from [facility]      HPI: Berenice Muse is a 75y o  year old male   With a history of remote CABG, prior coronary stent, preserved LV function, hypertension, hyperlipidemia, autoimmune hepatituis presents for a follow-up visit  He had atypical chest pain that last today hours 4/19, ultimately felt this was is gallbladder as there was some reproducibility  There was no exertional component, he has continued to work at his [employer], 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  His EKG today shows sinus rhythm to sinus tachycardia, nonspecific ST and T-wave abnormalities    He currently denies any cardiac symptoms  He feels quite well  He had some abdominal cramping and pain, turned out after eliminating lisinopril it resolved, therefore listing as an allergy  He continues on atenolol was slightly elevated blood pressure today, but insists it is normal at his other doctor's offices  Review of Systems      Past Medical History:   Diagnosis Date    [facility] )     Diabetes mellitus ([facility] )     Type 2     Pneumonia      Social History     Substance and Sexual Activity   Alcohol Use No     Social History     Substance and Sexual Activity   Drug Use No     Social History     Tobacco Use   Smoking Status Former Smoker    Packs/day: 0 25    Years: 35 00    Pack years: 8 75    Types: Cigarettes    Last attempt to quit: 2018    Years since quittin 2   Smokeless Tobacco Never Used   Tobacco Comment    STILL SMOKING ABOUT 3 CIGARETTES A DAY        Allergies:   Allergies   Allergen Reactions    Imuran [Azathioprine] Fever and Myalgia    Lisinopril Abdominal Pain       Medications:     Current Outpatient Medications:     aspirin 81 mg chewable tablet, Chew 1 tablet daily, Disp: , Rfl:     atenolol (TENORMIN) 50 mg tablet, Take 1 tablet (50 mg total) by mouth daily, Disp: 90 tablet, Rfl: 3    Blood Pressure Monitoring (BLOOD PRESSURE MONITOR/L CUFF) MISC, by Does not apply route once a week, Disp: 1 each, Rfl: 0    Empagliflozin (JARDIANCE) 10 MG TABS, Take 20 mg by mouth every morning, Disp: , Rfl:     glipiZIDE (GLUCOTROL XL) 10 mg 24 hr tablet, Take 20 mg by mouth daily, Disp: , Rfl:     mycophenolate (CELLCEPT) 500 mg tablet, Take 1,000 mg by mouth 2 (two) times a day, Disp: , Rfl: 0    predniSONE 20 mg tablet, Take 20 mg by mouth daily , Disp: , Rfl:     ursodiol (ACTIGALL) 300 mg capsule, Take 1 capsule by mouth 2 (two) times a day, Disp: , Rfl:       Vitals:    07/15/19 1437   BP: 142/66   Pulse: 83   SpO2: 94%     Weight (last 2 days) Date/Time   Weight    07/15/19 1437   71 6 (157 9)            Physical Exam   Constitutional: He is oriented to person, place, and time  No distress  HENT:   Head: Normocephalic and atraumatic  Eyes: Conjunctivae are normal  No scleral icterus  Neck: Normal range of motion  No JVD present  Cardiovascular: Normal rate, regular rhythm, normal heart sounds and intact distal pulses  No murmur heard  Pulmonary/Chest: Effort normal and breath sounds normal  No respiratory distress  He has no decreased breath sounds  He has no wheezes  He has no rhonchi  He has no rales  Musculoskeletal: He exhibits no edema  Right lower leg: Normal  He exhibits no edema  Left lower leg: Normal  He exhibits no edema  Neurological: He is alert and oriented to person, place, and time  Skin: Skin is warm and dry  He is not diaphoretic  Laboratory Studies:  CMP:      Invalid input(s): ALBUMIN  NT-proBNP: No results found for: NTBNP   Coags:    Lipid Profile:   No results found for: CHOL  No results found for: HDL  No results found for: LDLCALC  No results found for: TRIG    Cardiac testing:     ECG personally reviewed - Sinus tachycardia      stress Myoview 6/17-8 minutes of exercise, borderline ST segment changes, no ischemic symptoms, no perfusion abnormality, normal ejection fraction    Hetal Mann MD    Portions of the record may have been created with voice recognition software   Occasional wrong word or "sound a like" substitutions may have occurred due to the inherent limitations of voice recognition software   Read the chart carefully and recognize, using context, where substitutions have occurred

## 2019-11-18 DIAGNOSIS — I25.10 CORONARY ARTERY DISEASE INVOLVING NATIVE CORONARY ARTERY OF NATIVE HEART WITHOUT ANGINA PECTORIS: ICD-10-CM

## 2019-11-18 RX ORDER — ATENOLOL 50 MG/1
50 TABLET ORAL DAILY
Qty: 90 TABLET | Refills: 3 | Status: SHIPPED | OUTPATIENT
Start: 2019-11-18 | End: 2020-09-23 | Stop reason: SDUPTHER

## 2019-11-26 ENCOUNTER — TELEPHONE (OUTPATIENT)
Dept: GASTROENTEROLOGY | Facility: CLINIC | Age: 64
End: 2019-11-26

## 2019-11-26 NOTE — TELEPHONE ENCOUNTER
Dr Ivette Chapa - patient's PCP, Dr Leif Wrad requested patient's colonoscopy be faxed - Task completed   Fax [phone]   Sent to [facility] for chart inclusion

## 2020-08-19 ENCOUNTER — TELEPHONE (OUTPATIENT)
Dept: CARDIOLOGY CLINIC | Facility: CLINIC | Age: 65
End: 2020-08-19

## 2020-08-19 DIAGNOSIS — I10 ESSENTIAL HYPERTENSION: ICD-10-CM

## 2020-08-19 DIAGNOSIS — E78.5 HYPERLIPIDEMIA, UNSPECIFIED HYPERLIPIDEMIA TYPE: Primary | ICD-10-CM

## 2020-08-19 DIAGNOSIS — I25.10 CORONARY ARTERY DISEASE INVOLVING NATIVE CORONARY ARTERY OF NATIVE HEART WITHOUT ANGINA PECTORIS: ICD-10-CM

## 2020-08-19 NOTE — TELEPHONE ENCOUNTER
P/C asking for script to complete BW prior to OV  Pt does not recall having any BW completed recently, also checked care everywhere   Labs ordered and placed in mail

## 2020-09-23 ENCOUNTER — OFFICE VISIT (OUTPATIENT)
Dept: CARDIOLOGY CLINIC | Facility: MEDICAL CENTER | Age: 65
End: 2020-09-23
Payer: COMMERCIAL

## 2020-09-23 VITALS
SYSTOLIC BLOOD PRESSURE: 134 MMHG | OXYGEN SATURATION: 97 % | WEIGHT: 159.9 LBS | BODY MASS INDEX: 25.1 KG/M2 | DIASTOLIC BLOOD PRESSURE: 70 MMHG | TEMPERATURE: 96.1 F | HEIGHT: 67 IN | HEART RATE: 65 BPM

## 2020-09-23 DIAGNOSIS — K75.4 AUTOIMMUNE HEPATITIS (HCC): ICD-10-CM

## 2020-09-23 DIAGNOSIS — Z95.5 S/P DRUG ELUTING CORONARY STENT PLACEMENT: ICD-10-CM

## 2020-09-23 DIAGNOSIS — J84.9 INTERSTITIAL LUNG DISEASE (HCC): ICD-10-CM

## 2020-09-23 DIAGNOSIS — Z95.1 HX OF CABG: ICD-10-CM

## 2020-09-23 DIAGNOSIS — I10 BENIGN ESSENTIAL HYPERTENSION: ICD-10-CM

## 2020-09-23 DIAGNOSIS — I25.10 CORONARY ARTERY DISEASE INVOLVING NATIVE CORONARY ARTERY OF NATIVE HEART WITHOUT ANGINA PECTORIS: Primary | ICD-10-CM

## 2020-09-23 DIAGNOSIS — E78.5 HYPERLIPIDEMIA, UNSPECIFIED HYPERLIPIDEMIA TYPE: ICD-10-CM

## 2020-09-23 PROCEDURE — 93000 ELECTROCARDIOGRAM COMPLETE: CPT | Performed by: INTERNAL MEDICINE

## 2020-09-23 PROCEDURE — 99214 OFFICE O/P EST MOD 30 MIN: CPT | Performed by: INTERNAL MEDICINE

## 2020-09-23 RX ORDER — ATENOLOL 50 MG/1
50 TABLET ORAL DAILY
Qty: 90 TABLET | Refills: 3 | Status: SHIPPED | OUTPATIENT
Start: 2020-09-23 | End: 2021-09-07

## 2020-09-23 RX ORDER — MULTIVIT-MIN/IRON FUM/FOLIC AC 7.5 MG-4
1 TABLET ORAL DAILY
COMMUNITY

## 2020-09-23 NOTE — PROGRESS NOTES
Follow-up - Cardiology   Gabriel Jacobson 77 y o  male MRN: [id number]        Problems    1  Coronary artery disease involving native coronary artery of native heart without angina pectoris  POCT ECG    atenolol (TENORMIN) 50 mg tablet    Lipid panel   2  Benign essential hypertension  POCT ECG   3  Hyperlipidemia, unspecified hyperlipidemia type     4  Autoimmune hepatitis ([facility] )     5  Hx of CABG     6  Interstitial lung disease ([facility] )     7  S/P drug eluting coronary stent placement         Plan    I had the pleasure of having Ariel Tse return to see me at the [facility]  CAD  · Remote CABG and stenting  · Normal LV function  · Last stress test normal 6/17  · He offers me no new cardiac symptoms    Hypertension  · Blood pressure well controlled  · He takes atenolol    Hyperlipidemia  · No statin therapy due to autoimmune hepatitis  · , total 195    Autoimmune hepatitis  · Continues to follow with hepatology, and on immunosuppressive therapy, with CellCept, prednisone    PLAN:    No changes for today  Continue annual follow-up  Continues on immunosuppressive therapy for his autoimmune hepatitis  Has coexisting order immune interstitial lung disease, present by imaging since 2000, p r n  Follow-up with his pulmonologist as of visit 2 years ago  He has had no worsening symptoms      HPI: Gabriel Jacobson is a 77y o  year old male   With remote CABG, remote coronary stent, preserved LV function, hypertension, hyperlipidemia, autoimmune hepatituis and 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 presents for a follow-up visit  He has had no progression of any CAD symptoms  Last stress test was normal in 2017, EKG today shows no signs of ischemia, same septal infarct pattern  Hypertension is well controlled, he only takes atenolol  Lipids are reasonable considering no statin therapy in the presence of autoimmune hepatitis      He remains on immunosuppressive therapy, CellCept and prednisone, also taking Ursodiol  Has not had any progression in his interstitial lung disease, from a symptoms standpoint  So pulmonary about 2 years ago  He has diabetes, he takes glipizide and Jardiance  Review of Systems   Constitutional: Negative for appetite change, diaphoresis, fatigue and fever  Respiratory: Negative for chest tightness, shortness of breath and wheezing  Cardiovascular: Negative for chest pain, palpitations and leg swelling  Gastrointestinal: Negative for abdominal pain and blood in stool  Musculoskeletal: Negative for arthralgias and joint swelling  Skin: Negative for rash  Neurological: Negative for dizziness, syncope and light-headedness  Past Medical History:   Diagnosis Date    [facility]     [facility]     Type 2     Pneumonia      Social History     Substance and Sexual Activity   Alcohol Use No     Social History     Substance and Sexual Activity   Drug Use No     Social History     Tobacco Use   Smoking Status Former Smoker    Packs/day: 0 25    Years: 35 00    Pack years: 8 75    Types: Cigarettes    Last attempt to quit: 2018    Years since quittin 4   Smokeless Tobacco Never Used   Tobacco Comment    STILL SMOKING ABOUT 3 CIGARETTES A DAY        Allergies:   Allergies   Allergen Reactions    Imuran [Azathioprine] Fever and Myalgia    Lisinopril Abdominal Pain       Medications:     Current Outpatient Medications:     aspirin 81 mg chewable tablet, Chew 1 tablet daily, Disp: , Rfl:     atenolol (TENORMIN) 50 mg tablet, Take 1 tablet (50 mg total) by mouth daily, Disp: 90 tablet, Rfl: 3    Blood Pressure Monitoring (BLOOD PRESSURE MONITOR/L CUFF) MISC, by Does not apply route once a week, Disp: 1 each, Rfl: 0    Empagliflozin (JARDIANCE) 10 MG TABS, Take 20 mg by mouth every morning, Disp: , Rfl:     glipiZIDE (GLUCOTROL XL) 10 mg 24 hr tablet, Take 20 mg by mouth daily, Disp: , Rfl:     Multiple Vitamins-Minerals (multivitamin with minerals) tablet, Take 1 tablet by mouth daily, Disp: , Rfl:     mycophenolate (CELLCEPT) 500 mg tablet, Take 1,000 mg by mouth 2 (two) times a day, Disp: , Rfl: 0    predniSONE 20 mg tablet, Take 5 mg by mouth daily , Disp: , Rfl:     ursodiol (ACTIGALL) 300 mg capsule, Take 1 capsule by mouth 2 (two) times a day, Disp: , Rfl:       Vitals:    09/23/20 1112   BP: 134/70   Pulse: 65   Temp: (!) 96 1 °F (35 6 °C)   SpO2: 97%     Weight (last 2 days)     Date/Time   Weight    09/23/20 1112   72 5 (159 9)            Physical Exam  Constitutional:       General: He is not in acute distress  Appearance: He is not diaphoretic  HENT:      Head: Normocephalic and atraumatic  Eyes:      General: No scleral icterus  Conjunctiva/sclera: Conjunctivae normal    Neck:      Musculoskeletal: Normal range of motion  Vascular: No JVD  Cardiovascular:      Rate and Rhythm: Normal rate and regular rhythm  Heart sounds: Normal heart sounds  No murmur  Pulmonary:      Effort: Pulmonary effort is normal  No respiratory distress  Breath sounds: Normal breath sounds  No decreased breath sounds, wheezing, rhonchi or rales  Musculoskeletal:      Right lower leg: Normal  No edema  Left lower leg: Normal  No edema  Skin:     General: Skin is warm and dry  Neurological:      Mental Status: He is alert and oriented to person, place, and time             Laboratory Studies:  CMP:      Invalid input(s): ALBUMIN  NT-proBNP: No results found for: NTBNP   Coags:    Lipid Profile:   No results found for: CHOL  No results found for: HDL  No results found for: LDLCALC  No results found for: TRIG    Cardiac testing:     ECG personally reviewed  9/20-septal infarct, sinus rhythm     stress Myoview   6/17 -   8 minutes of exercise, borderline ST segment changes, no ischemic symptoms, no perfusion abnormality, normal ejection fraction    Isac Marie MD    Portions of the record may have been created with voice recognition software   Occasional wrong word or "sound a like" substitutions may have occurred due to the inherent limitations of voice recognition software   Read the chart carefully and recognize, using context, where substitutions have occurred

## 2021-09-03 DIAGNOSIS — I25.10 CORONARY ARTERY DISEASE INVOLVING NATIVE CORONARY ARTERY OF NATIVE HEART WITHOUT ANGINA PECTORIS: ICD-10-CM

## 2021-09-07 RX ORDER — ATENOLOL 50 MG/1
TABLET ORAL
Qty: 90 TABLET | Refills: 3 | Status: SHIPPED | OUTPATIENT
Start: 2021-09-07 | End: 2022-08-08

## 2021-09-08 ENCOUNTER — TELEPHONE (OUTPATIENT)
Dept: CARDIOLOGY CLINIC | Facility: MEDICAL CENTER | Age: 66
End: 2021-09-08

## 2021-09-14 ENCOUNTER — TELEPHONE (OUTPATIENT)
Dept: CARDIOLOGY CLINIC | Facility: CLINIC | Age: 66
End: 2021-09-14

## 2021-09-28 ENCOUNTER — OFFICE VISIT (OUTPATIENT)
Dept: CARDIOLOGY CLINIC | Facility: MEDICAL CENTER | Age: 66
End: 2021-09-28
Payer: COMMERCIAL

## 2021-09-28 VITALS
BODY MASS INDEX: 25.05 KG/M2 | OXYGEN SATURATION: 96 % | WEIGHT: 155.9 LBS | SYSTOLIC BLOOD PRESSURE: 126 MMHG | DIASTOLIC BLOOD PRESSURE: 68 MMHG | HEART RATE: 75 BPM | HEIGHT: 66 IN

## 2021-09-28 DIAGNOSIS — Z95.1 HX OF CABG: ICD-10-CM

## 2021-09-28 DIAGNOSIS — Z95.5 S/P DRUG ELUTING CORONARY STENT PLACEMENT: ICD-10-CM

## 2021-09-28 DIAGNOSIS — I10 BENIGN ESSENTIAL HYPERTENSION: ICD-10-CM

## 2021-09-28 DIAGNOSIS — K75.4 AUTOIMMUNE HEPATITIS (HCC): ICD-10-CM

## 2021-09-28 DIAGNOSIS — I25.10 CORONARY ARTERY DISEASE INVOLVING NATIVE CORONARY ARTERY OF NATIVE HEART WITHOUT ANGINA PECTORIS: Primary | ICD-10-CM

## 2021-09-28 DIAGNOSIS — E78.5 HYPERLIPIDEMIA, UNSPECIFIED HYPERLIPIDEMIA TYPE: ICD-10-CM

## 2021-09-28 DIAGNOSIS — R94.31 ABNORMAL ECG: ICD-10-CM

## 2021-09-28 PROCEDURE — 99214 OFFICE O/P EST MOD 30 MIN: CPT | Performed by: INTERNAL MEDICINE

## 2021-09-28 PROCEDURE — 93000 ELECTROCARDIOGRAM COMPLETE: CPT | Performed by: INTERNAL MEDICINE

## 2021-09-28 NOTE — PROGRESS NOTES
Follow-up - Cardiology   Nader Toussaint 74 y o  male MRN: [id number]        Problems    1  Coronary artery disease involving native coronary artery of native heart without angina pectoris  POCT ECG   2  Hyperlipidemia, unspecified hyperlipidemia type     3  Autoimmune hepatitis ([address] )     4  Benign essential hypertension     5  Hx of CABG     6  S/P drug eluting coronary stent placement         Plan    I had the pleasure of having Diane Vincent return to see me at the [facility]  CAD  · Remote CABG and stenting  · Last stress test was in 2017  · While he denies any new symptoms, he presents with an abnormal EKG today with left bundle branch block, having had normal conduction on prior EKGs  Hypertension  · Blood pressure is well controlled on atenolol    Hyperlipidemia  · He does not take statin therapy on account of his autoimmune hepatitis, a recommendation by his hepatologist    ILD  · Dr Muna Sandoval pulmonology follows him  · Velcro like rales remain present today, last CT of the chest was in 2019 and felt to be stable, has had no change in symptoms    Autoimmune hepatitis  · Continues to follow with hepatology, continues on CellCept    PLAN:    Because of the change in EKG, with development of left bundle branch block, will have him proceed with Gokul Thayer stress test  Otherwise continue annual follow-up      HPI: Nader Toussaint is a 74y o  year old male   With remote CABG, remote coronary stent, preserved LV function, hypertension, hyperlipidemia, autoimmune hepatituis and 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 presents for a follow-up visit  He offers no new symptoms of progressive CAD, who presents with a new diagnosis of left bundle branch block today on EKG      Hypertension is well controlled on atenolol    Lipids continue to be reasonable in the setting of lifestyle modification only    He continues on immunosuppressive therapy with CellCept due to his order immune hepatitis, and felt to be too high risk for statin therapy    Interstitial lung disease remains stable by symptoms, last imaging in 2019    Diabetes is stable on glipizide and Jardiance      Review of Systems   Constitutional: Negative for appetite change, diaphoresis, fatigue and fever  Respiratory: Negative for chest tightness, shortness of breath and wheezing  Cardiovascular: Negative for chest pain, palpitations and leg swelling  Gastrointestinal: Negative for abdominal pain and blood in stool  Musculoskeletal: Negative for arthralgias and joint swelling  Skin: Negative for rash  Neurological: Negative for dizziness, syncope and light-headedness  Past Medical History:   Diagnosis Date    Auto[facility] [address] [address] )     Diabetes mellitus ([facility] )     Type 2     Pneumonia 2018     Social History     Substance and Sexual Activity   Alcohol Use No     Social History     Substance and Sexual Activity   Drug Use No     Social History     Tobacco Use   Smoking Status Former Smoker    Packs/day: 0 25    Years: 35 00    Pack years: 8 75    Types: Cigarettes    Quit date: 05/2018    Years since quitting: 3 4   Smokeless Tobacco Never Used   Tobacco Comment    STILL SMOKING ABOUT 3 CIGARETTES A DAY        Allergies:   Allergies   Allergen Reactions    Imuran [Azathioprine] Fever and Myalgia    Lisinopril Abdominal Pain       Medications:     Current Outpatient Medications:     aspirin 81 mg chewable tablet, Chew 1 tablet daily, Disp: , Rfl:     atenolol (TENORMIN) 50 mg tablet, TAKE 1 TABLET BY MOUTH  DAILY, Disp: 90 tablet, Rfl: 3    Blood Pressure Monitoring (BLOOD PRESSURE MONITOR/L CUFF) MISC, by Does not apply route once a week, Disp: 1 each, Rfl: 0    Dexilant 60 MG capsule, , Disp: , Rfl:     Empagliflozin (JARDIANCE) 10 MG TABS, Take 20 mg by mouth every morning, Disp: , Rfl:     glipiZIDE (GLUCOTROL XL) 10 mg 24 hr tablet, Take 20 mg by mouth daily, Disp: , Rfl:     Multiple Vitamins-Minerals (multivitamin with minerals) tablet, Take 1 tablet by mouth daily, Disp: , Rfl:     mycophenolate (CELLCEPT) 500 mg tablet, Take 1,000 mg by mouth 2 (two) times a day, Disp: , Rfl: 0    predniSONE 20 mg tablet, Take 5 mg by mouth daily , Disp: , Rfl:     ursodiol (ACTIGALL) 300 mg capsule, Take 1 capsule by mouth 2 (two) times a day, Disp: , Rfl:       Vitals:    09/28/21 1338   BP: 126/68   Pulse: 75   SpO2: 96%     Weight (last 2 days)     Date/Time   Weight    09/28/21 1338   70 7 (155 9)            Physical Exam  Constitutional:       General: He is not in acute distress  Appearance: He is not diaphoretic  HENT:      Head: Normocephalic and atraumatic  Eyes:      General: No scleral icterus  Conjunctiva/sclera: Conjunctivae normal    Neck:      Vascular: No JVD  Cardiovascular:      Rate and Rhythm: Normal rate and regular rhythm  Heart sounds: Normal heart sounds  No murmur heard  Pulmonary:      Effort: Pulmonary effort is normal  No respiratory distress  Breath sounds: Normal breath sounds  No decreased breath sounds, wheezing, rhonchi or rales  Musculoskeletal:      Cervical back: Normal range of motion  Right lower leg: Normal  No edema  Left lower leg: Normal  No edema  Skin:     General: Skin is warm and dry  Neurological:      Mental Status: He is alert and oriented to person, place, and time             Laboratory Studies:  CMP:      Invalid input(s): ALBUMIN  NT-proBNP: No results found for: NTBNP   Coags:    Lipid Profile:   No results found for: CHOL  No results found for: HDL  No results found for: LDLCALC  No results found for: TRIG    Cardiac testing:     ECG personally reviewed  Results for orders placed or performed in visit on 09/28/21   POCT ECG    Impression    Sinus rhythm, left bundle branch block          stress Myoview   6/17 -   8 minutes of exercise, borderline ST segment changes, no ischemic symptoms, no perfusion abnormality, normal ejection fraction    Deepa Benavides MD    Portions of the record may have been created with voice recognition software   Occasional wrong word or "sound a like" substitutions may have occurred due to the inherent limitations of voice recognition software   Read the chart carefully and recognize, using context, where substitutions have occurred

## 2022-01-28 ENCOUNTER — TELEPHONE (OUTPATIENT)
Dept: CARDIOLOGY CLINIC | Facility: CLINIC | Age: 67
End: 2022-01-28

## 2022-01-28 NOTE — TELEPHONE ENCOUNTER
We received faxed stress test results from [facility]  Dr Cecilia Gallo reviewed the results  Per Dr Cecilia Gallo, "the results look good and there are no new concerning blockages "    I called and advised the pt of these results

## 2022-08-06 DIAGNOSIS — I25.10 CORONARY ARTERY DISEASE INVOLVING NATIVE CORONARY ARTERY OF NATIVE HEART WITHOUT ANGINA PECTORIS: ICD-10-CM

## 2022-08-08 RX ORDER — ATENOLOL 50 MG/1
TABLET ORAL
Qty: 90 TABLET | Refills: 3 | Status: SHIPPED | OUTPATIENT
Start: 2022-08-08

## 2022-11-16 ENCOUNTER — OFFICE VISIT (OUTPATIENT)
Dept: CARDIOLOGY CLINIC | Facility: MEDICAL CENTER | Age: 67
End: 2022-11-16

## 2022-11-16 VITALS
HEART RATE: 81 BPM | DIASTOLIC BLOOD PRESSURE: 80 MMHG | HEIGHT: 66 IN | WEIGHT: 140 LBS | SYSTOLIC BLOOD PRESSURE: 122 MMHG | OXYGEN SATURATION: 97 % | BODY MASS INDEX: 22.5 KG/M2

## 2022-11-16 DIAGNOSIS — I25.10 CORONARY ARTERY DISEASE INVOLVING NATIVE CORONARY ARTERY OF NATIVE HEART WITHOUT ANGINA PECTORIS: ICD-10-CM

## 2022-11-16 DIAGNOSIS — K75.4 AUTOIMMUNE HEPATITIS (HCC): ICD-10-CM

## 2022-11-16 DIAGNOSIS — Z95.1 HX OF CABG: ICD-10-CM

## 2022-11-16 DIAGNOSIS — E78.5 HYPERLIPIDEMIA, UNSPECIFIED HYPERLIPIDEMIA TYPE: ICD-10-CM

## 2022-11-16 DIAGNOSIS — I44.7 LBBB (LEFT BUNDLE BRANCH BLOCK): ICD-10-CM

## 2022-11-16 DIAGNOSIS — Z95.5 S/P DRUG ELUTING CORONARY STENT PLACEMENT: ICD-10-CM

## 2022-11-16 DIAGNOSIS — I10 BENIGN ESSENTIAL HYPERTENSION: Primary | ICD-10-CM

## 2022-11-16 DIAGNOSIS — J84.9 INTERSTITIAL LUNG DISEASE (HCC): ICD-10-CM

## 2022-11-16 RX ORDER — OLMESARTAN MEDOXOMIL 5 MG/1
TABLET ORAL
COMMUNITY
Start: 2022-11-02

## 2022-11-16 RX ORDER — AMOXICILLIN AND CLAVULANATE POTASSIUM 875; 125 MG/1; MG/1
TABLET, FILM COATED ORAL
COMMUNITY
Start: 2022-11-14

## 2022-11-16 NOTE — PROGRESS NOTES
Follow-up - Cardiology   Mayra Jackson 69 y o  male MRN: [id number]        Problems    1  Benign essential hypertension        2  Hyperlipidemia, unspecified hyperlipidemia type        3  Interstitial lung disease ([address] )        4  Autoimmune hepatitis ([facility] [address] )        5  Hx of CABG        6  S/P drug eluting coronary stent placement        7  LBBB (left bundle branch block)        8  Coronary artery disease involving native coronary artery of native heart without angina pectoris            Plan    I had the pleasure of having Lou Hay return to see me at the [facility]  CAD  Remote CABG and stenting  Last stress test was in 2017 and normal with an EF of 60%  Left bundle branch block newly discovered 2021, sent him for stress Myoview, had done at [facility], EF 51% with areas of fixed defect suggesting infarct per the report, but all in the absence of any significant ischemic symptoms      Hypertension  Blood pressure excellent on atenolol    Hyperlipidemia  Is not take statin therapy at the recommendation of his hepatologist due to autoimmune hepatitis  LDL is 74    ILD  Dr Alyssa Alvarado pulmonology follows him  Continues to have mild velcro like rales on examination, he is stable clinically    Autoimmune hepatitis  Continues to follow with hepatology, continues on CellCept    PLAN:    He is going to try and get me the images from his nuclear stress Myoview which was done at [facility] earlier this year  The result suggested an EF of 51% which is normal, but fixed defects which suggest infarcts per the read associated with some mild areas of hypokinesis  This is different than his stress testing from 2017 which was normal with an EF of 60%  The primary reason for the test was new evidence of left bundle branch block last year  That being said, 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, he has no anginal symptoms, and these results do not have me recommending any invasive workup at this time   Unfortunately he lost a lot of weight recently due to all of his teeth being extracted with significant teary dietary intake change  On a good note his A1c dropped to 7 3 because of his change in intake as well  HPI: Bri Rascon is a 69y o  year old male   With remote CABG, remote coronary stent, preserved LV function, hypertension, hyperlipidemia, autoimmune hepatituis and 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 presents for a follow-up visit  He denies new symptoms of angina or ischemia, despite his abnormal stress test 1/22 at [facility] suggesting an EF of 51% with fixed defects concerning for infarct, this was primarily in a follow-up to newly developed left bundle branch block in the absence of symptoms in 2021   Images unavailable   Diabetes rapidly improved, A1c 7 3, sounds like has had a lot to do with his dental issues, almost all of his teeth being pulled, dietary change with significant weight loss  Blood pressure is excellent on atenolol  LDL is 74, he does not take statin therapy due to his autoimmune hepatitis and treatment medications put  Benicar low-dose 5 mg was recently started for diabetic renal protection  Review of Systems   Constitutional: Negative for appetite change, diaphoresis, fatigue and fever  Respiratory: Negative for chest tightness, shortness of breath and wheezing  Cardiovascular: Negative for chest pain, palpitations and leg swelling  Gastrointestinal: Negative for abdominal pain and blood in stool  Musculoskeletal: Negative for arthralgias and joint swelling  Skin: Negative for rash  Neurological: Negative for dizziness, syncope and light-headedness           Past Medical History:   Diagnosis Date   • Autoimmune [address] [address] )    • Diabetes mellitus [address] [address] [address] )     Type 2    • Pneumonia 2018     Social History     Substance and Sexual Activity   Alcohol Use No     Social History     Substance and Sexual Activity   Drug Use No     Social History     Tobacco Use   Smoking Status Former   • Packs/day: 0 25   • Years: 35 00   • Pack years: 8 75   • Types: Cigarettes   • Quit date: 2018   • Years since quittin 5   Smokeless Tobacco Never   Tobacco Comments    STILL SMOKING ABOUT 3 CIGARETTES A DAY        Allergies: Allergies   Allergen Reactions   • Imuran [Azathioprine] Fever and Myalgia   • Lisinopril Abdominal Pain       Medications:     Current Outpatient Medications:   •  amoxicillin-clavulanate (AUGMENTIN) 875-125 mg per tablet, take 1 tablet every 12 hours for 10 days, Disp: , Rfl:   •  aspirin 81 mg chewable tablet, Chew 1 tablet daily, Disp: , Rfl:   •  atenolol (TENORMIN) 50 mg tablet, TAKE 1 TABLET BY MOUTH  DAILY, Disp: 90 tablet, Rfl: 3  •  Blood Pressure Monitoring (BLOOD PRESSURE MONITOR/L CUFF) MISC, by Does not apply route once a week, Disp: 1 each, Rfl: 0  •  Dexilant 60 MG capsule, , Disp: , Rfl:   •  Empagliflozin (JARDIANCE) 10 MG TABS tablet, Take 20 mg by mouth every morning, Disp: , Rfl:   •  glipiZIDE (GLUCOTROL XL) 10 mg 24 hr tablet, Take 20 mg by mouth daily, Disp: , Rfl:   •  Multiple Vitamins-Minerals (multivitamin with minerals) tablet, Take 1 tablet by mouth daily, Disp: , Rfl:   •  mycophenolate (CELLCEPT) 500 mg tablet, Take 1,000 mg by mouth 2 (two) times a day, Disp: , Rfl: 0  •  olmesartan (BENICAR) 5 mg tablet, , Disp: , Rfl:   •  predniSONE 20 mg tablet, Take 5 mg by mouth daily , Disp: , Rfl:   •  ursodiol (ACTIGALL) 300 mg capsule, Take 1 capsule by mouth 2 (two) times a day, Disp: , Rfl:       Vitals:    22 1606   BP: 122/80   Pulse: 81   SpO2: 97%     Weight (last 2 days)     Date/Time Weight    22 1606 63 5 (140)        Physical Exam  Constitutional:       General: He is not in acute distress  Appearance: He is not diaphoretic  HENT:      Head: Normocephalic and atraumatic  Eyes:      General: No scleral icterus       Conjunctiva/sclera: Conjunctivae normal    Neck: Vascular: No JVD  Cardiovascular:      Rate and Rhythm: Normal rate and regular rhythm  Pulses: Intact distal pulses  Heart sounds: Normal heart sounds  No murmur heard  Pulmonary:      Effort: Pulmonary effort is normal  No respiratory distress  Breath sounds: Rales (Velcro like basal rales) present  No decreased breath sounds, wheezing or rhonchi  Musculoskeletal:         General: No edema  Cervical back: Normal range of motion  Right lower leg: Normal  No edema  Left lower leg: Normal  No edema  Skin:     General: Skin is warm and dry  Neurological:      Mental Status: He is alert and oriented to person, place, and time  Laboratory Studies:  CMP:      Invalid input(s): ALBUMIN  NT-proBNP: No results found for: NTBNP   Coags:    Lipid Profile:   No results found for: CHOL  No results found for: HDL  No results found for: LDLCALC  No results found for: TRIG    Cardiac testing:     ECG personally reviewed  No results found for this visit on 11/16/22  stress Myoview   6/17 -   8 minutes of exercise, borderline ST segment changes, no ischemic symptoms, no perfusion abnormality, normal ejection fraction  1/22-Lexiscan Myoview stress test due to no new left bundle branch block, done at [facility], EF 51% with regions of fixed defects and hypokinesis suggesting infarct but no ischemia    Lyudmila Ellison MD    Portions of the record may have been created with voice recognition software   Occasional wrong word or "sound a like" substitutions may have occurred due to the inherent limitations of voice recognition software   Read the chart carefully and recognize, using context, where substitutions have occurred

## 2023-06-07 ENCOUNTER — TELEPHONE (OUTPATIENT)
Dept: CARDIOLOGY CLINIC | Facility: MEDICAL CENTER | Age: 68
End: 2023-06-07

## 2023-06-15 ENCOUNTER — OFFICE VISIT (OUTPATIENT)
Dept: CARDIOLOGY CLINIC | Facility: CLINIC | Age: 68
End: 2023-06-15
Payer: COMMERCIAL

## 2023-06-15 VITALS
WEIGHT: 138.7 LBS | BODY MASS INDEX: 22.39 KG/M2 | DIASTOLIC BLOOD PRESSURE: 54 MMHG | SYSTOLIC BLOOD PRESSURE: 118 MMHG | HEART RATE: 81 BPM

## 2023-06-15 DIAGNOSIS — K75.4 AUTOIMMUNE HEPATITIS (HCC): ICD-10-CM

## 2023-06-15 DIAGNOSIS — I10 BENIGN ESSENTIAL HYPERTENSION: Primary | ICD-10-CM

## 2023-06-15 DIAGNOSIS — J84.9 INTERSTITIAL LUNG DISEASE (HCC): ICD-10-CM

## 2023-06-15 DIAGNOSIS — I44.7 LBBB (LEFT BUNDLE BRANCH BLOCK): ICD-10-CM

## 2023-06-15 DIAGNOSIS — E78.5 HYPERLIPIDEMIA, UNSPECIFIED HYPERLIPIDEMIA TYPE: ICD-10-CM

## 2023-06-15 DIAGNOSIS — I63.10 CEREBROVASCULAR ACCIDENT (CVA) DUE TO EMBOLISM OF PRECEREBRAL ARTERY (HCC): ICD-10-CM

## 2023-06-15 DIAGNOSIS — Z95.5 S/P DRUG ELUTING CORONARY STENT PLACEMENT: ICD-10-CM

## 2023-06-15 DIAGNOSIS — I25.10 CORONARY ARTERY DISEASE INVOLVING NATIVE CORONARY ARTERY OF NATIVE HEART WITHOUT ANGINA PECTORIS: ICD-10-CM

## 2023-06-15 DIAGNOSIS — Z95.1 HX OF CABG: ICD-10-CM

## 2023-06-15 PROCEDURE — 93000 ELECTROCARDIOGRAM COMPLETE: CPT | Performed by: INTERNAL MEDICINE

## 2023-06-15 PROCEDURE — 99214 OFFICE O/P EST MOD 30 MIN: CPT | Performed by: INTERNAL MEDICINE

## 2023-06-15 RX ORDER — CLOPIDOGREL BISULFATE 75 MG/1
75 TABLET ORAL DAILY
COMMUNITY
Start: 2023-06-02

## 2023-06-15 NOTE — PROGRESS NOTES
Follow-up - Cardiology   Muna Landeros 77 y o  male MRN: [id number]        Problems    1  Benign essential hypertension  POCT ECG      2  Coronary artery disease involving native coronary artery of native heart without angina pectoris        3  Hyperlipidemia, unspecified hyperlipidemia type        4  LBBB (left bundle branch block)        5  S/P drug eluting coronary stent placement        6  Hx of CABG        7  Autoimmune hepatitis ([facility] [address] )        8  Interstitial lung disease ([facility] [address] )        9  Cerebrovascular accident (CVA) due to embolism of precerebral artery (HCC)  VAS carotid complete study    Echo complete w/ contrast if indicated    AMB extended holter monitor          Impression    Visit for recent stroke, hospitalized at [facility]    CAD  Remote CABG and stenting  Last stress test was in 2017 and normal with an EF of 60%  Left bundle branch block started in 2021  Now nonspecific IVCD  No cardiac symptoms    CVA  6/23, cryptogenic, no residual deficits  Right-sided lesion, left-sided symptoms  Placed on Plavix by his PCP  Cannot take statins due to his liver disease  We discussed the appropriate work-up  His LDL is 74    Hypertension  Pressure well controlled    Hyperlipidemia  Immune hepatitis precludes statin therapy per his hepatologist  LDL is 74    ILD  Dr Shirley Ahuja pulmonology follows him  Chronic bibasilar Velcro-like crackles    Autoimmune hepatitis  Continues on CellCept, follows with hepatology    PLAN:    Check echocardiogram  Check 2-week Zio patch to rule out A-fib  Check carotid Dopplers  6-month follow-up  Now on Plavix in place of aspirin    HPI: Muna Landeros is a 77y o  year old male   With remote CABG, remote coronary stent, preserved LV function, hypertension, hyperlipidemia, autoimmune hepatituis and 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 presents for a follow-up visit       He was coming back from [place], he developed blurry vision, a bloodshot eye, left arm paresthesias and hand numbness, hospitalized with discovery of a right-sided CVA, MRA was mostly unremarkable, carotid imaging was not done, and symptoms resolved  He is unable to take statin therapy due to his hepatitis, has well-controlled blood pressure, aspirin was stopped in favor of Plavix, he tells me he does not have follow-up with neurology  He has a slight cough, he has chronic interstitial lung disease, he denies any significant cardiac symptoms from a CAD standpoint  Review of Systems   Constitutional: Negative for appetite change, diaphoresis, fatigue and fever  Respiratory: Positive for cough  Negative for chest tightness, shortness of breath and wheezing  Cardiovascular: Negative for chest pain, palpitations and leg swelling  Gastrointestinal: Negative for abdominal pain and blood in stool  Musculoskeletal: Negative for arthralgias and joint swelling  Skin: Negative for rash  Neurological: Negative for dizziness, syncope and light-headedness  All other systems reviewed and are negative  Past Medical History:   Diagnosis Date   • Autoimmune hepatitis ([facility] )    • Diabetes mellitus ([facility] )     Type 2    • Pneumonia      Social History     Substance and Sexual Activity   Alcohol Use No     Social History     Substance and Sexual Activity   Drug Use No     Social History     Tobacco Use   Smoking Status Former   • Packs/day: 0 25   • Years: 35 00   • Total pack years: 8 75   • Types: Cigarettes   • Quit date: 2018   • Years since quittin 1   Smokeless Tobacco Never   Tobacco Comments    STILL SMOKING ABOUT 3 CIGARETTES A DAY        Allergies:   Allergies   Allergen Reactions   • Imuran [Azathioprine] Fever and Myalgia   • Lisinopril Abdominal Pain       Medications:     Current Outpatient Medications:   •  atenolol (TENORMIN) 50 mg tablet, TAKE 1 TABLET BY MOUTH  DAILY, Disp: 90 tablet, Rfl: 3  •  clopidogrel (PLAVIX) 75 mg tablet, Take 75 mg by mouth daily, Disp: , Rfl: •  Dexilant 60 MG capsule, , Disp: , Rfl:   •  glipiZIDE (GLUCOTROL XL) 10 mg 24 hr tablet, Take 20 mg by mouth daily, Disp: , Rfl:   •  ipratropium (ATROVENT) 0 06 % nasal spray, 2 sprays into each nostril 4 (four) times a day, Disp: 15 mL, Rfl: 6  •  Jardiance 25 MG TABS, , Disp: , Rfl:   •  Multiple Vitamins-Minerals (multivitamin with minerals) tablet, Take 1 tablet by mouth daily, Disp: , Rfl:   •  mupirocin (BACTROBAN) 2 % ointment, , Disp: , Rfl:   •  mycophenolate (CELLCEPT) 500 mg tablet, Take 1,500 mg by mouth 2 (two) times a day, Disp: , Rfl: 0  •  nystatin (MYCOSTATIN) cream, apply topically to affected area three times a day, Disp: , Rfl:   •  olmesartan (BENICAR) 5 mg tablet, , Disp: , Rfl:   •  predniSONE 20 mg tablet, Take 5 mg by mouth daily , Disp: , Rfl:   •  ursodiol (ACTIGALL) 300 mg capsule, Take 1 capsule by mouth 2 (two) times a day, Disp: , Rfl:       Vitals:    06/15/23 1449   BP: 118/54   Pulse: 81     Weight (last 2 days)     Date/Time Weight    06/15/23 1449 62 9 (138 7)        Physical Exam  Constitutional:       General: He is not in acute distress  Appearance: Normal appearance  He is well-developed  He is not diaphoretic  HENT:      Head: Normocephalic and atraumatic  Eyes:      General: No scleral icterus  Conjunctiva/sclera: Conjunctivae normal       Pupils: Pupils are equal, round, and reactive to light  Neck:      Thyroid: No thyromegaly  Vascular: No JVD  Cardiovascular:      Rate and Rhythm: Normal rate and regular rhythm  Heart sounds: Normal heart sounds  No murmur heard  No friction rub  No gallop  Pulmonary:      Effort: Pulmonary effort is normal  No respiratory distress  Breath sounds: No wheezing or rales  Comments: Diffuse bibasilar crackles, chronic  Abdominal:      General: Bowel sounds are normal  There is no distension  Palpations: Abdomen is soft  Tenderness: There is no abdominal tenderness     Musculoskeletal: "       General: No deformity  Normal range of motion  Cervical back: Normal range of motion and neck supple  Right lower leg: No edema  Left lower leg: No edema  Skin:     General: Skin is warm and dry  Findings: No erythema or rash  Neurological:      General: No focal deficit present  Mental Status: He is alert and oriented to person, place, and time  Cranial Nerves: No cranial nerve deficit  Motor: No weakness  Laboratory Studies:  CMP:      Invalid input(s): \"ALBUMIN\"  NT-proBNP: No results found for: \"NTBNP\"   Coags:    Lipid Profile:   No results found for: \"CHOL\"  No results found for: \"HDL\"  No results found for: \"LDLCALC\"  No results found for: \"TRIG\"    Cardiac testing:     ECG personally reviewed  Results for orders placed or performed in visit on 06/15/23   POCT ECG    Impression    Sinus rhythm, biatrial enlargement, nonspecific IVCD, possible inferior ischemia          stress Myoview   6/17 -   8 minutes of exercise, borderline ST segment changes, no ischemic symptoms, no perfusion abnormality, normal ejection fraction  1/22-Lexiscan Myoview stress test due to no new left bundle branch block, done at [facility], EF 51% with regions of fixed defects and hypokinesis suggesting infarct but no ischemia    Martine Hargrove MD    Portions of the record may have been created with voice recognition software   Occasional wrong word or \"sound a like\" substitutions may have occurred due to the inherent limitations of voice recognition software   Read the chart carefully and recognize, using context, where substitutions have occurred    "

## 2023-06-28 ENCOUNTER — TELEPHONE (OUTPATIENT)
Dept: CARDIOLOGY CLINIC | Facility: CLINIC | Age: 68
End: 2023-06-28

## 2023-06-28 NOTE — TELEPHONE ENCOUNTER
Cherelle, this is Loras Born from the radiology department at [facility]  We have a mutual patient, an Melissa Rubi, date of birth 1955 coming in from a vascular carotid duplex, bilateral complete and I need a script for that please  It can be faxed to [phone]  Any questions? I can be reached at [phone]  Thank you      Faxed to [phone]

## 2023-07-19 ENCOUNTER — TELEPHONE (OUTPATIENT)
Dept: CARDIOLOGY CLINIC | Facility: CLINIC | Age: 68
End: 2023-07-19

## 2023-07-21 ENCOUNTER — CLINICAL SUPPORT (OUTPATIENT)
Dept: CARDIOLOGY CLINIC | Facility: CLINIC | Age: 68
End: 2023-07-21
Payer: COMMERCIAL

## 2023-07-21 DIAGNOSIS — I63.10 CEREBROVASCULAR ACCIDENT (CVA) DUE TO EMBOLISM OF PRECEREBRAL ARTERY (HCC): ICD-10-CM

## 2023-07-21 DIAGNOSIS — I49.3 PREMATURE VENTRICULAR CONTRACTIONS: ICD-10-CM

## 2023-07-21 PROCEDURE — 93248 EXT ECG>7D<15D REV&INTERPJ: CPT

## 2023-10-02 ENCOUNTER — OFFICE VISIT (OUTPATIENT)
Dept: NEUROLOGY | Facility: CLINIC | Age: 68
End: 2023-10-02
Payer: COMMERCIAL

## 2023-10-02 VITALS
HEIGHT: 66 IN | BODY MASS INDEX: 21.1 KG/M2 | DIASTOLIC BLOOD PRESSURE: 60 MMHG | HEART RATE: 80 BPM | SYSTOLIC BLOOD PRESSURE: 112 MMHG | WEIGHT: 131.3 LBS | TEMPERATURE: 97.8 F

## 2023-10-02 DIAGNOSIS — I10 BENIGN ESSENTIAL HYPERTENSION: ICD-10-CM

## 2023-10-02 DIAGNOSIS — Z86.73 HISTORY OF STROKE: Primary | ICD-10-CM

## 2023-10-02 DIAGNOSIS — E78.5 HYPERLIPIDEMIA, UNSPECIFIED HYPERLIPIDEMIA TYPE: ICD-10-CM

## 2023-10-02 DIAGNOSIS — Z72.0 TOBACCO ABUSE: ICD-10-CM

## 2023-10-02 PROCEDURE — 99204 OFFICE O/P NEW MOD 45 MIN: CPT | Performed by: PSYCHIATRY & NEUROLOGY

## 2023-10-02 NOTE — PATIENT INSTRUCTIONS
Stroke: Amrik Cardoso presents for an initial consultation with regard to a stroke which occurred in June of this year. He reports no new stroke symptoms. He is taking his medicine as prescribed and has been appropriately transitioned to Plavix. He has had an MRI of the brain, MRA of the head, carotid Doppler ultrasound, echocardiogram, and extended Holter monitoring. At this point his stroke is most likely the result of small vessel ischemic disease.  -For ongoing stroke prevention he should continue his combination of Plavix with appropriate blood pressure and glycemic control  -He is not a candidate for a statin medication as result of autoimmune hepatitis. It would not be unreasonable, at the discretion of his hepatologist and primary care doctor, to consider a fish oil supplement or potentially something like niacin to help improve HDL and provide some additional protection.  -From my standpoint he is not in need of repeat cerebrovascular imaging  -We will defer to his primary care team to monitor his cholesterol panel and blood sugar numbers and would recommend targeting an ongoing LDL cholesterol of less than 70 if possible, and hemoglobin A1c of less than 7%. He has done a great job of checking his blood pressure occasionally away from the doctor's office and it has been in a good range. He should continue to do so and target numbers less than 130/80  -We would strongly encourage him to take the final steps towards quitting smoking and we discussed that in the office today.   Particularly we would recommend he continue to look for specific replacements for whenever he would typically smoke, adjust behavior around, and smoking triggers, and make sure he has solid coping skills for times of anger or anxiety or stress that are not tobacco related  -From a neurology standpoint this stroke does appear consistent with small vessel ischemic disease but considering his lung disease it also places him at some risk for atrial fibrillation. At this time I do not feel he needs an implantable loop recorder but it could be considered in the future, particularly if he has any episodes of palpitations or otherwise unexplained lightheadedness    He will return to the office in 8 months time to see either myself or one of the stroke advanced practice providers. If he were to have strokelike symptoms such as sudden painless loss of vision or double vision, difficulty speaking or swallowing, vertigo/room spinning that does not quickly resolve, or sudden weakness/numbness/loss of coordination affecting 1 side of the face or body he should proceed by ambulance to the nearest emergency room immediately.

## 2023-10-02 NOTE — PROGRESS NOTES
Patient ID: Librado Hill is a 77 y.o. male who presents to the [address] [place] [address]. Assessment/Plan:   Patient Instructions   Stroke: Deandre Piedra presents for an initial consultation with regard to a stroke which occurred in June of this year. He reports no new stroke symptoms. He is taking his medicine as prescribed and has been appropriately transitioned to Plavix. He has had an MRI of the brain, MRA of the head, carotid Doppler ultrasound, echocardiogram, and extended Holter monitoring. At this point his stroke is most likely the result of small vessel ischemic disease.  -For ongoing stroke prevention he should continue his combination of Plavix with appropriate blood pressure and glycemic control  -He is not a candidate for a statin medication as result of autoimmune hepatitis. It would not be unreasonable, at the discretion of his hepatologist and primary care doctor, to consider a fish oil supplement or potentially something like niacin to help improve HDL and provide some additional protection.  -From my standpoint he is not in need of repeat cerebrovascular imaging  -We will defer to his primary care team to monitor his cholesterol panel and blood sugar numbers and would recommend targeting an ongoing LDL cholesterol of less than 70 if possible, and hemoglobin A1c of less than 7%. He has done a great job of checking his blood pressure occasionally away from the doctor's office and it has been in a good range. He should continue to do so and target numbers less than 130/80  -We would strongly encourage him to take the final steps towards quitting smoking and we discussed that in the office today.   Particularly we would recommend he continue to look for specific replacements for whenever he would typically smoke, adjust behavior around, and smoking triggers, and make sure he has solid coping skills for times of anger or anxiety or stress that are not tobacco related  -From a neurology standpoint this stroke does appear consistent with small vessel ischemic disease but considering his lung disease it also places him at some risk for atrial fibrillation. At this time I do not feel he needs an implantable loop recorder but it could be considered in the future, particularly if he has any episodes of palpitations or otherwise unexplained lightheadedness    He will return to the office in 8 months time to see either myself or one of the stroke advanced practice providers. If he were to have strokelike symptoms such as sudden painless loss of vision or double vision, difficulty speaking or swallowing, vertigo/room spinning that does not quickly resolve, or sudden weakness/numbness/loss of coordination affecting 1 side of the face or body he should proceed by ambulance to the nearest emergency room immediately. Diagnoses and all orders for this visit:    History of stroke    Benign essential hypertension    Hyperlipidemia, unspecified hyperlipidemia type    Tobacco abuse           Subjective:    LAURIE Almeida is a 77 y.o. male who presents for evaluation of stroke. He has some risk factors for small vessel ischemic disease including diabetes, smoking (now down to 1 pack/week), remote history of hypertension but well controlled currently, dyslipidemia. On the day in question he experienced double vision along with some drooping along the left eye, questionable change in the left upper extremity, a left subconjunctival hemorrhage, and a change in mood. I was able to directly review his subsequent MRI which does confirm an acute ischemic stroke in the right occipital region, subcortical.  There is also mild microvascular disease. He had an MRA of the head which was not available for me to review directly but confirms a right fetal PCA so that blood flow to that region does come from the right internal carotid.   He had a carotid Doppler ultrasound which showed only very mild plaque/stenosis of any kind. He had an extended Holter monitor which did not show any atrial fibrillation as well as an echocardiogram.    At this time considering the size appearance and location of his stroke I would suggest it is most likely related to small vessel ischemic disease and the transition to clopidogrel is appropriate given that he was on aspirin faithfully at the time. We do not have clear evidence for hypercoagulable state of malignancy at this time although he has been losing weight with some appetite fluctuation on a regular basis. We do not have current evidence to support atrial fibrillation.     We did have a discussion with regard to smoking cessation and strategies to assist him        Lab Results   Component Value Date/Time    HGBA1C 7.2 (H) 08/15/2023 10:23 AM     Lab Results   Component Value Date/Time     08/15/2023 10:23 AM           Past Medical History:   Diagnosis Date   • Autoimmune hepatitis ([address])    • Diabetes mellitus ([address])     Type 2    • Pneumonia 2018       Current Outpatient Medications:   •  atenolol (TENORMIN) 50 mg tablet, TAKE 1 TABLET BY MOUTH  DAILY, Disp: 90 tablet, Rfl: 3  •  clopidogrel (PLAVIX) 75 mg tablet, Take 75 mg by mouth daily, Disp: , Rfl:   •  glipiZIDE (GLUCOTROL XL) 10 mg 24 hr tablet, Take 20 mg by mouth daily, Disp: , Rfl:   •  ipratropium (ATROVENT) 0.06 % nasal spray, 2 sprays into each nostril 4 (four) times a day (Patient taking differently: 2 sprays into each nostril if needed), Disp: 15 mL, Rfl: 6  •  Jardiance 25 MG TABS, Take 25 mg by mouth in the morning, Disp: , Rfl:   •  Multiple Vitamins-Minerals (multivitamin with minerals) tablet, Take 1 tablet by mouth daily, Disp: , Rfl:   •  mupirocin (BACTROBAN) 2 % ointment, , Disp: , Rfl:   •  mycophenolate (CELLCEPT) 500 mg tablet, Take 1,000 mg by mouth 2 (two) times a day, Disp: , Rfl: 0  •  nystatin (MYCOSTATIN) cream, Apply topically as needed, Disp: , Rfl:   •  olmesartan (BENICAR) 5 mg tablet, Take 5 mg by mouth daily, Disp: , Rfl:   •  predniSONE 5 mg tablet, Take 5 mg by mouth daily, Disp: , Rfl:   •  ursodiol (ACTIGALL) 300 mg capsule, Take 1 capsule by mouth 2 (two) times a day, Disp: , Rfl:   •  Dexilant 60 MG capsule, Take 60 mg by mouth daily (Patient not taking: Reported on 10/2/2023), Disp: , Rfl:      Objective:    Blood pressure 112/60, pulse 80, temperature 97.8 °F (36.6 °C), temperature source Temporal, height 5' 6" (1.676 m), weight 59.6 kg (131 lb 4.8 oz). Neurological Exam    At the time of my examination he was awake and alert and in no distress. He was wearing a mask due to being immune compromised. There is no dysarthria or aphasia. Cranial nerves II through XII are symmetrically intact bilaterally. Motor testing reveals symmetric strength in the bilateral upper and lower extremities. There was some difference in the degree of full arm frontal extension on drift testing and a long longitudinal scar in the midline of the volar aspect of the left arm. There was no drift, finger-nose reveals intact proprioceptive function with no tremor or ataxia. Sensation was symmetric to light touch in the distal bilateral upper and lower extremities. He was able to rise easily without assistance and his gait was stable. ROS:    Review of Systems   Constitutional: Negative for appetite change, fatigue and fever. HENT: Negative. Negative for hearing loss, tinnitus, trouble swallowing and voice change. Eyes: Negative. Negative for photophobia, pain and visual disturbance. Respiratory: Negative. Negative for shortness of breath. Cardiovascular: Negative. Negative for palpitations. Gastrointestinal: Negative. Negative for nausea and vomiting. Endocrine: Negative. Negative for cold intolerance. Genitourinary: Negative. Negative for dysuria, frequency and urgency. Musculoskeletal: Negative for back pain, gait problem, myalgias and neck pain. Skin: Negative. Negative for rash. Allergic/Immunologic: Negative. Neurological: Positive for headaches. Negative for dizziness, tremors, seizures, syncope, facial asymmetry, speech difficulty, weakness, light-headedness and numbness. Hematological: Negative. Does not bruise/bleed easily. Psychiatric/Behavioral: Negative. Negative for confusion, hallucinations and sleep disturbance. I have spent a total time of 48 minutes on 10/02/23 in caring for this patient including Diagnostic results, Prognosis, Risks and benefits of tx options, Instructions for management, Patient and family education, Importance of tx compliance, Risk factor reductions, Impressions, Counseling / Coordination of care, Documenting in the medical record, Reviewing / ordering tests, medicine, procedures   and Obtaining or reviewing history  .

## 2024-01-29 ENCOUNTER — TELEPHONE (OUTPATIENT)
Dept: NEUROLOGY | Facility: CLINIC | Age: 69
End: 2024-01-29

## 2024-03-21 ENCOUNTER — PATIENT MESSAGE (OUTPATIENT)
Dept: NEUROLOGY | Facility: CLINIC | Age: 69
End: 2024-03-21

## 2024-03-21 NOTE — PATIENT COMMUNICATION
last visit Dr. Palacios 10/2, hx of stroke  F/u scheduled 4/23    I called patient to ask if he had any further events or updates since last visit; if so,  please cb or respond via my chart.  Otherwise, will ask Dr. Palacios if he feels imaging is warranted prior to office visit..

## 2024-04-23 ENCOUNTER — OFFICE VISIT (OUTPATIENT)
Dept: NEUROLOGY | Facility: CLINIC | Age: 69
End: 2024-04-23
Payer: COMMERCIAL

## 2024-04-23 VITALS
SYSTOLIC BLOOD PRESSURE: 113 MMHG | HEART RATE: 64 BPM | TEMPERATURE: 98.6 F | HEIGHT: 66 IN | OXYGEN SATURATION: 98 % | WEIGHT: 133.6 LBS | DIASTOLIC BLOOD PRESSURE: 67 MMHG | BODY MASS INDEX: 21.47 KG/M2

## 2024-04-23 DIAGNOSIS — I10 BENIGN ESSENTIAL HYPERTENSION: ICD-10-CM

## 2024-04-23 DIAGNOSIS — I65.23 BILATERAL CAROTID ARTERY STENOSIS: ICD-10-CM

## 2024-04-23 DIAGNOSIS — E78.5 HYPERLIPIDEMIA, UNSPECIFIED HYPERLIPIDEMIA TYPE: ICD-10-CM

## 2024-04-23 DIAGNOSIS — Z86.73 HISTORY OF STROKE: Primary | ICD-10-CM

## 2024-04-23 DIAGNOSIS — Z72.0 TOBACCO ABUSE: ICD-10-CM

## 2024-04-23 PROCEDURE — 99214 OFFICE O/P EST MOD 30 MIN: CPT | Performed by: PSYCHIATRY & NEUROLOGY

## 2024-04-23 PROCEDURE — G2211 COMPLEX E/M VISIT ADD ON: HCPCS | Performed by: PSYCHIATRY & NEUROLOGY

## 2024-04-23 NOTE — PATIENT INSTRUCTIONS
Stroke: Emanuel presents for a follow-up with regard to his prior stroke which occurred in June of last year.  He reports no new strokelike symptoms.  He continues to work towards quitting smoking and is doing an excellent job with reducing his use of tobacco.  His vascular risk factors are under reasonable control at this time.  -For ongoing stroke prevention he should continue his combination of Plavix and appropriate blood pressure and glycemic control  -At this point in time he is not a candidate for a statin medication as a result of autoimmune hepatitis but we would recommend using a Mediterranean or mind style diet and targeting an LDL cholesterol as low as possible  -We will defer to his primary care team for monitoring of his cholesterol panel and blood sugar numbers and would recommend an ongoing LDL cholesterol of less than 70 if possible and a hemoglobin A1c of less than 7%  -We applaud him as he continues to work towards quitting smoking entirely and we would be happy to provide any assistance with regard to tobacco cessation at his discretion  -He will be due for a carotid Doppler ultrasound in June of this year which has already been ordered by his cardiology group.  This should be followed at least once per year unless things worsen at which point in time it may be reasonable to consider increasing frequency to every 6 months  -When he is treating headache or intermittent pain he avoids acetaminophen as result of autoimmune hepatitis but was also told to avoid ibuprofen.  He has been using aspirin but that may have a little more of a blood thinning effect compared to other nonsteroidal anti-inflammatory medications.  I would recommend he follow-up with his hepatologist to see if naproxen would be a reasonable option.  If naproxen would be a reasonable option we would recommend using over-the-counter naproxen according to package instructions for as needed treatment of pain as opposed to aspirin    From  my standpoint he is doing exceptionally well.  He will follow-up with us on an as-needed basis but we would be happy to work with him at any time.  If he were to have strokelike symptoms such as sudden painless loss of vision or double vision, difficulty speaking or swallowing, vertigo/room spinning that does not quickly resolve, or sudden weakness/numbness/loss of coordination affecting 1 side of the face or body he should proceed by ambulance to the nearest emergency room immediately.

## 2024-04-23 NOTE — PROGRESS NOTES
Patient Instructions   Stroke: Emanuel presents for a follow-up with regard to his prior stroke which occurred in June of last year.  He reports no new strokelike symptoms.  He continues to work towards quitting smoking and is doing an excellent job with reducing his use of tobacco.  His vascular risk factors are under reasonable control at this time.  -For ongoing stroke prevention he should continue his combination of Plavix and appropriate blood pressure and glycemic control  -At this point in time he is not a candidate for a statin medication as a result of autoimmune hepatitis but we would recommend using a Mediterranean or mind style diet and targeting an LDL cholesterol as low as possible  -We will defer to his primary care team for monitoring of his cholesterol panel and blood sugar numbers and would recommend an ongoing LDL cholesterol of less than 70 if possible and a hemoglobin A1c of less than 7%  -We applaud him as he continues to work towards quitting smoking entirely and we would be happy to provide any assistance with regard to tobacco cessation at his discretion  -He will be due for a carotid Doppler ultrasound in June of this year which has already been ordered by his cardiology group.  This should be followed at least once per year unless things worsen at which point in time it may be reasonable to consider increasing frequency to every 6 months  -When he is treating headache or intermittent pain he avoids acetaminophen as result of autoimmune hepatitis but was also told to avoid ibuprofen.  He has been using aspirin but that may have a little more of a blood thinning effect compared to other nonsteroidal anti-inflammatory medications.  I would recommend he follow-up with his hepatologist to see if naproxen would be a reasonable option.  If naproxen would be a reasonable option we would recommend using over-the-counter naproxen according to package instructions for as needed treatment of pain as  "opposed to aspirin    From my standpoint he is doing exceptionally well.  He will follow-up with us on an as-needed basis but we would be happy to work with him at any time.  If he were to have strokelike symptoms such as sudden painless loss of vision or double vision, difficulty speaking or swallowing, vertigo/room spinning that does not quickly resolve, or sudden weakness/numbness/loss of coordination affecting 1 side of the face or body he should proceed by ambulance to the nearest emergency room immediately.      HPI:      Emanuel Naranjo is a 68 y.o. male who presents for follow up in regard to his prior stroke.      He reports no new or recurrent stroke symptoms.  He does not endorse severe bleeding or bruising issues but notes that it may bleed just a little more if he isabel his finger.  He continues to work on quitting smoking and is definitely not going out to smoke as much, he hopes to be completely off of smoking by his next birthday.    Upon review of his records he does have mild bilateral carotid stenosis which is being followed by the cardiology group and he is due for a Doppler ultrasound in June of this year which has been ordered.  His most recent hemoglobin A1c is at 7.2%.  His most recent LDL cholesterol was in the 80s which is slightly above optimal but given that he cannot take statin medications this is quite reasonable.    He notes that for intermittent headaches he uses aspirin as he was told to avoid ibuprofen.      Lab Results   Component Value Date/Time    HGBA1C 7.2 (H) 08/15/2023 10:23 AM     Lab Results   Component Value Date/Time     08/15/2023 10:23 AM     Objective:    /67 (BP Location: Right arm, Patient Position: Sitting, Cuff Size: Adult)   Pulse 64   Temp 98.6 °F (37 °C) (Temporal)   Ht 5' 6\" (1.676 m)   Wt 60.6 kg (133 lb 9.6 oz)   SpO2 98%   BMI 21.56 kg/m²     Neurologic exam:  At the time of my examination he was awake and alert and in no distress.  He " is a reasonable historian with no dysarthria or aphasia.  Cranial nerves II through XII are symmetrically intact.  Motor testing reveals 5/5 strength in the bilateral upper and lower extremities.  There is no obvious tremor or ataxia.  Light touch was symmetric in the bilateral upper and lower extremities.  He was able to arise easily without assistance and his gait was stable.        Review of Systems   Constitutional:  Negative for appetite change, fatigue and fever.   HENT: Negative.  Negative for hearing loss, tinnitus, trouble swallowing and voice change.    Eyes: Negative.  Negative for photophobia, pain and visual disturbance.   Respiratory: Negative.  Negative for shortness of breath.    Cardiovascular: Negative.  Negative for palpitations.   Gastrointestinal: Negative.  Negative for nausea and vomiting.   Endocrine: Negative.  Negative for cold intolerance.   Genitourinary: Negative.  Negative for dysuria, frequency and urgency.   Musculoskeletal:  Negative for back pain, gait problem, myalgias, neck pain and neck stiffness.   Skin: Negative.  Negative for rash.   Allergic/Immunologic: Negative.    Neurological:  Positive for headaches (mild headaches). Negative for dizziness, tremors, seizures, syncope, facial asymmetry, speech difficulty, weakness, light-headedness and numbness.   Hematological: Negative.  Does not bruise/bleed easily.   Psychiatric/Behavioral: Negative.  Negative for confusion, hallucinations and sleep disturbance.    All other systems reviewed and are negative.

## 2024-05-23 ENCOUNTER — PATIENT MESSAGE (OUTPATIENT)
Dept: CARDIOLOGY CLINIC | Facility: CLINIC | Age: 69
End: 2024-05-23

## 2024-05-23 ENCOUNTER — NURSE TRIAGE (OUTPATIENT)
Age: 69
End: 2024-05-23

## 2024-05-23 NOTE — TELEPHONE ENCOUNTER
"Reason for Disposition   MILD swelling of both ankles (i.e., pedal edema) AND new-onset or worsening    Answer Assessment - Initial Assessment Questions  1. ONSET: \"When did the swelling start?\" (e.g., minutes, hours, days)      4 or 5 days ago, getting worse  2. LOCATION: \"What part of the leg is swollen?\"  \"Are both legs swollen or just one leg?\"      Left leg mostly, ankle and top of foot  3. SEVERITY: \"How bad is the swelling?\" (e.g., localized; mild, moderate, severe)   - Localized - small area of swelling localized to one leg     - MILD pedal edema - swelling limited to foot and ankle, pitting edema < 1/4 inch (6 mm) deep, rest and elevation eliminate most or all swelling   - MODERATE edema - swelling of lower leg to knee, pitting edema > 1/4 inch (6 mm) deep, rest and elevation only partially reduce swelling   Having some moderate swelling    - SEVERE edema - swelling extends above knee, facial or hand swelling present         4. REDNESS: \"Does the swelling look red or infected?\"      Slightly red by large toe  5. PAIN: \"Is the swelling painful to touch?\" If Yes, ask: \"How painful is it?\"   (Scale 1-10; mild, moderate or severe)      Slight pain yesterday but no pain today just feels tight  6. FEVER: \"Do you have a fever?\" If Yes, ask: \"What is it, how was it measured, and when did it start?\"       Denies   Pt recently had upper respiratory infection about 2 weeks ago, had finished antibiotic on Monday  7. CAUSE: \"What do you think is causing the leg swelling?\"      Unsure   8. MEDICAL HISTORY: \"Do you have a history of heart failure, kidney disease, liver failure, or cancer?\"      bypass  9. RECURRENT SYMPTOM: \"Have you had leg swelling before?\" If Yes, ask: \"When was the last time?\" \"What happened that time?\"      Has never had swelling like this before  10. OTHER SYMPTOMS: \"Do you have any other symptoms?\" (e.g., chest pain, difficulty breathing)        Denies chest pains, sob,palipations  11. PREGNANCY: " "\"Is there any chance you are pregnant?\" \"When was your last menstrual period?\"        N/a  Does not take BP/HR at home  After procedure colonoscopy /67    Pt has an upcoming appt 6/13/2024    Protocols used: Leg Swelling and Edema-ADULT-[place]    "

## 2024-05-24 NOTE — TELEPHONE ENCOUNTER
Pain and swelling and redness should be evaluated by PCP or urgent care right away, can't just assume its cardiac.

## 2024-06-13 ENCOUNTER — OFFICE VISIT (OUTPATIENT)
Dept: CARDIOLOGY CLINIC | Facility: MEDICAL CENTER | Age: 69
End: 2024-06-13
Payer: COMMERCIAL

## 2024-06-13 VITALS
BODY MASS INDEX: 21.86 KG/M2 | OXYGEN SATURATION: 96 % | HEART RATE: 77 BPM | WEIGHT: 136 LBS | DIASTOLIC BLOOD PRESSURE: 70 MMHG | HEIGHT: 66 IN | SYSTOLIC BLOOD PRESSURE: 118 MMHG

## 2024-06-13 DIAGNOSIS — R60.0 BILATERAL LEG EDEMA: Primary | ICD-10-CM

## 2024-06-13 DIAGNOSIS — I10 BENIGN ESSENTIAL HYPERTENSION: ICD-10-CM

## 2024-06-13 DIAGNOSIS — I44.7 LBBB (LEFT BUNDLE BRANCH BLOCK): ICD-10-CM

## 2024-06-13 DIAGNOSIS — Z95.5 S/P DRUG ELUTING CORONARY STENT PLACEMENT: ICD-10-CM

## 2024-06-13 DIAGNOSIS — Z95.1 HX OF CABG: ICD-10-CM

## 2024-06-13 DIAGNOSIS — E78.5 HYPERLIPIDEMIA, UNSPECIFIED HYPERLIPIDEMIA TYPE: ICD-10-CM

## 2024-06-13 DIAGNOSIS — I25.10 CORONARY ARTERY DISEASE INVOLVING NATIVE CORONARY ARTERY OF NATIVE HEART WITHOUT ANGINA PECTORIS: ICD-10-CM

## 2024-06-13 PROCEDURE — 99214 OFFICE O/P EST MOD 30 MIN: CPT | Performed by: INTERNAL MEDICINE

## 2024-06-13 PROCEDURE — G2211 COMPLEX E/M VISIT ADD ON: HCPCS | Performed by: INTERNAL MEDICINE

## 2024-06-13 RX ORDER — FUROSEMIDE 20 MG/1
20 TABLET ORAL DAILY
Qty: 30 TABLET | Refills: 3 | Status: SHIPPED | OUTPATIENT
Start: 2024-06-13 | End: 2024-06-13

## 2024-06-13 RX ORDER — FUROSEMIDE 20 MG/1
20 TABLET ORAL DAILY
Qty: 30 TABLET | Refills: 3 | Status: SHIPPED | OUTPATIENT
Start: 2024-06-13

## 2024-06-13 RX ORDER — AMOXICILLIN AND CLAVULANATE POTASSIUM 875; 125 MG/1; MG/1
TABLET, FILM COATED ORAL
COMMUNITY
Start: 2024-06-03

## 2024-06-13 NOTE — PROGRESS NOTES
Follow-up - Cardiology   Emanuel Naranjo 69 y.o. male MRN: [id number]        Problems    1. Bilateral leg edema  furosemide (LASIX) 20 mg tablet    DISCONTINUED: furosemide (LASIX) 20 mg tablet      2. Coronary artery disease involving native coronary artery of native heart without angina pectoris        3. Hyperlipidemia, unspecified hyperlipidemia type        4. LBBB (left bundle branch block)        5. Benign essential hypertension        6. S/P drug eluting coronary stent placement        7. Hx of CABG            Impression    Visit for recent stroke, hospitalized at [facility]    Bilateral leg edema  With minimal JVD, all after colonoscopy last month  Mild redness of the left foot  No respiratory symptoms though  No current diuretic  LVEF last year 50 to 55% 6/23 with grade 1 diastolic dysfunction and no significant valve disease    CAD  Remote CABG and stenting  Last stress test was in 2017 and normal with an EF of 60%  Left bundle branch block started in 2021  Now nonspecific IVCD  Does not offer new cardiovascular anginal symptoms    CVA  6/23, cryptogenic, no residual deficits  Right-sided lesion, left-sided symptoms  Continues on Plavix per his PCP  Unable to take statins due to liver disease  Zio patch 7/23 negative for A-fib    Hypertension  Blood pressure is controlled    Hyperlipidemia  Immune hepatitis precludes statin therapy per his hepatologist  Lipids are normal    ILD  Dr. Pate pulmonology follows him  He has chronic bibasilar Velcro-like crackles    Autoimmune hepatitis  Continues on CellCept, follows with hepatology    PLAN:    Furosemide 20 mg daily just as long as it takes to remove his edema.  After that as needed dosing only  I asked him to call me if the edema is still present after 1 week and no better despite the Lasix, I am happy to do a video virtual visit.  Otherwise 6-month follow-up as long as things improve    HPI: Emanuel Naranjo is a 69 y.o. 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     He recently underwent colonoscopy, was off Plavix for 5 days, had unfortunate anal bleeding that did self resolved, in this context he had follow-up blood work that was pretty unremarkable, normal renal function, no major electrolyte derangements, CBC has been normal, no anemia, but he developed leg edema a week or 2 after the colonoscopy, it has been persistent for the last 2 to 3 weeks, he denies any respiratory symptoms.  He denies orthopnea.  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  He denies chest pain or chest pressure.  He denies palpitations, lightheadedness or dizziness.      Review of Systems   Constitutional:  Negative for appetite change, diaphoresis, fatigue and fever.   Respiratory:  Negative for chest tightness, shortness of breath and wheezing.    Cardiovascular:  Positive for leg swelling. Negative for chest pain and palpitations.   Gastrointestinal:  Negative for abdominal pain and blood in stool.   Musculoskeletal:  Negative for arthralgias and joint swelling.   Skin:  Negative for rash.   Neurological:  Negative for dizziness, syncope and light-headedness.         Past Medical History:   Diagnosis Date   • Autoimmune hepatitis (HCC)    • Diabetes mellitus (HCC)     Type 2    • Pneumonia      Social History     Substance and Sexual Activity   Alcohol Use No     Social History     Substance and Sexual Activity   Drug Use No     Social History     Tobacco Use   Smoking Status Former   • Current packs/day: 0.00   • Average packs/day: 0.3 packs/day for 35.0 years (8.8 ttl pk-yrs)   • Types: Cigarettes   • Start date: 1983   • Quit date: 2018   • Years since quittin.1   Smokeless Tobacco Never   Tobacco Comments    STILL SMOKING ABOUT 3 CIGARETTES A  DAY        Allergies:  Allergies   Allergen Reactions   • Imuran [Azathioprine] Fever and Myalgia   • Lisinopril Abdominal Pain       Medications:     Current Outpatient Medications:   •  amoxicillin-clavulanate (AUGMENTIN) 875-125 mg per tablet, take 1 tablet by mouth every 12 hours with meals for 14 days, Disp: , Rfl:   •  atenolol (TENORMIN) 50 mg tablet, TAKE 1 TABLET BY MOUTH  DAILY, Disp: 90 tablet, Rfl: 3  •  clopidogrel (PLAVIX) 75 mg tablet, Take 75 mg by mouth daily, Disp: , Rfl:   •  furosemide (LASIX) 20 mg tablet, Take 1 tablet (20 mg total) by mouth daily, Disp: 30 tablet, Rfl: 3  •  glipiZIDE (GLUCOTROL XL) 10 mg 24 hr tablet, Take 20 mg by mouth daily, Disp: , Rfl:   •  ipratropium (ATROVENT) 0.06 % nasal spray, 2 sprays into each nostril 4 (four) times a day (Patient taking differently: 2 sprays into each nostril if needed), Disp: 15 mL, Rfl: 6  •  Jardiance 25 MG TABS, Take 25 mg by mouth in the morning, Disp: , Rfl:   •  Multiple Vitamins-Minerals (multivitamin with minerals) tablet, Take 1 tablet by mouth daily, Disp: , Rfl:   •  mupirocin (BACTROBAN) 2 % ointment, , Disp: , Rfl:   •  mycophenolate (CELLCEPT) 500 mg tablet, Take 1,000 mg by mouth 2 (two) times a day, Disp: , Rfl: 0  •  nystatin (MYCOSTATIN) cream, Apply topically as needed, Disp: , Rfl:   •  olmesartan (BENICAR) 5 mg tablet, Take 5 mg by mouth daily, Disp: , Rfl:   •  predniSONE 5 mg tablet, Take 5 mg by mouth daily, Disp: , Rfl:   •  ursodiol (ACTIGALL) 300 mg capsule, Take 1 capsule by mouth 2 (two) times a day, Disp: , Rfl:       Vitals:    06/13/24 0907   BP: 118/70   Pulse: 77   SpO2: 96%     Weight (last 2 days)     Date/Time Weight    06/13/24 0907 61.7 (136)        Physical Exam  Constitutional:       General: He is not in acute distress.     Appearance: He is not diaphoretic.   HENT:      Head: Normocephalic and atraumatic.   Eyes:      General: No scleral icterus.     Conjunctiva/sclera: Conjunctivae normal.   Neck:      " Vascular: JVD present.   Cardiovascular:      Rate and Rhythm: Normal rate and regular rhythm.      Heart sounds: Normal heart sounds. No murmur heard.  Pulmonary:      Effort: Pulmonary effort is normal. No respiratory distress.      Breath sounds: Normal breath sounds. No decreased breath sounds, wheezing, rhonchi or rales.   Musculoskeletal:      Cervical back: Normal range of motion.      Right lower leg: Edema present.      Left lower leg: Edema present.   Skin:     General: Skin is warm and dry.   Neurological:      Mental Status: He is alert and oriented to person, place, and time.           Laboratory Studies:  CMP:  Results from last 7 days   Lab Units 06/10/24  1438   POTASSIUM mmol/L 4.7   CHLORIDE mmol/L 95*   CO2 mmol/L 30   BUN mg/dL 20   CREATININE mg/dL 0.68   CALCIUM mg/dL 9.5   AST U/L 36   ALT U/L 31   ALK PHOS U/L 39   EGFR  99       NT-proBNP: No results found for: \"NTBNP\"   Coags:    Lipid Profile:   No results found for: \"CHOL\"  No results found for: \"HDL\"  No results found for: \"LDLCALC\"  No results found for: \"TRIG\"    Cardiac testing:     ECG personally reviewed  No results found for this visit on 06/13/24.         stress Myoview   6/17 -   8 minutes of exercise, borderline ST segment changes, no ischemic symptoms, no perfusion abnormality, normal ejection fraction  1/22-Lexiscan Myoview stress test due to no new left bundle branch block, done at [facility], EF 51% with regions of fixed defects and hypokinesis suggesting infarct but no ischemia    John Haley MD    Portions of the record may have been created with voice recognition software.  Occasional wrong word or \"sound a like\" substitutions may have occurred due to the inherent limitations of voice recognition software.  Read the chart carefully and recognize, using context, where substitutions have occurred.  "

## 2024-08-23 ENCOUNTER — TELEPHONE (OUTPATIENT)
Dept: CARDIOLOGY CLINIC | Facility: CLINIC | Age: 69
End: 2024-08-23

## 2024-08-27 NOTE — TELEPHONE ENCOUNTER
[facility]     Doctor: Sudha     Reason for call: Calling to request EKG tracing and results. Also chest xray if possible. Stating they are requiring this for his surgery coming up on August 27 for partial glossectomy.   Fax to [phone] attn: Jena     Call back#: [phone]    
Faxed to location.  
Statement Selected

## 2024-10-23 ENCOUNTER — TELEPHONE (OUTPATIENT)
Age: 69
End: 2024-10-23

## 2024-10-23 DIAGNOSIS — I25.10 CORONARY ARTERY DISEASE INVOLVING NATIVE CORONARY ARTERY OF NATIVE HEART WITHOUT ANGINA PECTORIS: Primary | ICD-10-CM

## 2024-10-23 DIAGNOSIS — E11.69 TYPE 2 DIABETES MELLITUS WITH OTHER SPECIFIED COMPLICATION, WITHOUT LONG-TERM CURRENT USE OF INSULIN (HCC): ICD-10-CM

## 2024-10-23 NOTE — TELEPHONE ENCOUNTER
Caller: Emanuel    Doctor: Dr. Haley     Reason for call: Patient would like to know if there is any routine blood work or testing Dr. Haley would like to send the patient before? Please advise    Call back#: [phone]

## 2024-11-27 LAB
CHOLEST SERPL-MCNC: 180 MG/DL
CHOLEST/HDLC SERPL: 2.8 {RATIO}
EST. AVERAGE GLUCOSE BLD GHB EST-MCNC: 171 MG/DL
HBA1C MFR BLD: 7.6 %
HDLC SERPL-MCNC: 64 MG/DL (ref 23–92)
LDLC SERPL CALC-MCNC: 92 MG/DL
NONHDLC SERPL-MCNC: 116 MG/DL
TRIGL SERPL-MCNC: 118 MG/DL

## 2024-12-02 ENCOUNTER — OFFICE VISIT (OUTPATIENT)
Dept: CARDIOLOGY CLINIC | Facility: MEDICAL CENTER | Age: 69
End: 2024-12-02
Payer: COMMERCIAL

## 2024-12-02 VITALS
SYSTOLIC BLOOD PRESSURE: 128 MMHG | DIASTOLIC BLOOD PRESSURE: 70 MMHG | WEIGHT: 136 LBS | HEIGHT: 66 IN | OXYGEN SATURATION: 95 % | HEART RATE: 90 BPM | BODY MASS INDEX: 21.86 KG/M2

## 2024-12-02 DIAGNOSIS — I25.10 CORONARY ARTERY DISEASE INVOLVING NATIVE CORONARY ARTERY OF NATIVE HEART WITHOUT ANGINA PECTORIS: ICD-10-CM

## 2024-12-02 DIAGNOSIS — K75.4 AUTOIMMUNE HEPATITIS (HCC): ICD-10-CM

## 2024-12-02 DIAGNOSIS — I44.7 LBBB (LEFT BUNDLE BRANCH BLOCK): ICD-10-CM

## 2024-12-02 DIAGNOSIS — E78.5 HYPERLIPIDEMIA, UNSPECIFIED HYPERLIPIDEMIA TYPE: ICD-10-CM

## 2024-12-02 DIAGNOSIS — Z95.1 HX OF CABG: ICD-10-CM

## 2024-12-02 DIAGNOSIS — I10 BENIGN ESSENTIAL HYPERTENSION: Primary | ICD-10-CM

## 2024-12-02 PROCEDURE — 99214 OFFICE O/P EST MOD 30 MIN: CPT | Performed by: INTERNAL MEDICINE

## 2024-12-02 NOTE — PROGRESS NOTES
Follow-up - Cardiology   Emanuel Naranjo 69 y.o. male MRN: [id number]      Impression    Bilateral leg edema  With minimal JVD, all after colonoscopy last month  Mild redness of the left foot  No respiratory symptoms though  No current diuretic  LVEF last year 50 to 55% 6/23 with grade 1 diastolic dysfunction and no significant valve disease    CAD  Remote CABG and stenting  Last stress test was in 2017 and normal with an EF of 60%  Left bundle branch block started in 2021      CVA  6/23, cryptogenic, no residual deficits  Right-sided lesion, left-sided symptoms  Continues on Plavix per his PCP  Unable to take statins due to liver disease  Zio patch 7/23 negative for A-fib  No recurrent symptoms thankfully    Hypertension  Blood pressure is controlled    Hyperlipidemia  Immune hepatitis precludes statin therapy per his hepatologist  Lipids remain normal, LDL 92    ILD  Dr. Pate pulmonology follows him  He has chronic bibasilar Velcro-like crackles    Autoimmune hepatitis  Continues on CellCept, follows with hepatology    PLAN:    1 year follow-up  Lipids and BMP ordered for next year    HPI: Emanuel Naranjo is a 69 y.o. 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     He had a recent tongue infection, required ENT intervention and a partial glossectomy, he is healing well, although in the context of this with prednisone use she had a very high A1c, this is improved, he is on Jardiance but having penile irritation planning on stopping it and discussing this with his other physicians.  He is also on glipizide.  His A1c is now down to 7.6.  His cholesterol is well-controlled without medical therapy considering his inability to take statin therapy on account of autoimmune hepatitis, his LDL is 92.  His blood pressure is excellent.  He has no  cardiovascular complaints today.  His wife hears occasional heavy heartbeats, he does have PVCs, noted on his Zio patch from , but very low burden at 1%.  He is asymptomatic      Review of Systems   Constitutional:  Negative for appetite change, diaphoresis, fatigue and fever.   Respiratory:  Negative for chest tightness, shortness of breath and wheezing.    Cardiovascular:  Negative for chest pain, palpitations and leg swelling.   Gastrointestinal:  Negative for abdominal pain and blood in stool.   Musculoskeletal:  Negative for arthralgias and joint swelling.   Skin:  Negative for rash.   Neurological:  Negative for dizziness, syncope and light-headedness.         Past Medical History:   Diagnosis Date    Autoimmune hepatitis (HCC)     Diabetes mellitus (HCC)     Type 2     Pneumonia      Social History     Substance and Sexual Activity   Alcohol Use No     Social History     Substance and Sexual Activity   Drug Use No     Social History     Tobacco Use   Smoking Status Former    Current packs/day: 0.00    Average packs/day: 0.3 packs/day for 35.0 years (8.8 ttl pk-yrs)    Types: Cigarettes    Start date: 1983    Quit date: 2018    Years since quittin.5   Smokeless Tobacco Never   Tobacco Comments    STILL SMOKING ABOUT 3 CIGARETTES A DAY        Allergies:  Allergies   Allergen Reactions    Imuran [Azathioprine] Fever and Myalgia    Lisinopril Abdominal Pain       Medications:     Current Outpatient Medications:     amoxicillin-clavulanate (AUGMENTIN) 875-125 mg per tablet, take 1 tablet by mouth every 12 hours with meals for 14 days, Disp: , Rfl:     atenolol (TENORMIN) 50 mg tablet, TAKE 1 TABLET BY MOUTH  DAILY, Disp: 90 tablet, Rfl: 3    clopidogrel (PLAVIX) 75 mg tablet, Take 75 mg by mouth daily, Disp: , Rfl:     furosemide (LASIX) 20 mg tablet, Take 1 tablet (20 mg total) by mouth daily, Disp: 30 tablet, Rfl: 3    glipiZIDE (GLUCOTROL XL) 10 mg 24 hr tablet, Take 20 mg by mouth daily,  "Disp: , Rfl:     ipratropium (ATROVENT) 0.06 % nasal spray, 2 sprays into each nostril 4 (four) times a day (Patient taking differently: 2 sprays into each nostril if needed), Disp: 15 mL, Rfl: 6    Jardiance 25 MG TABS, Take 25 mg by mouth in the morning, Disp: , Rfl:     Multiple Vitamins-Minerals (multivitamin with minerals) tablet, Take 1 tablet by mouth daily, Disp: , Rfl:     mupirocin (BACTROBAN) 2 % ointment, , Disp: , Rfl:     mycophenolate (CELLCEPT) 500 mg tablet, Take 1,000 mg by mouth 2 (two) times a day, Disp: , Rfl: 0    nystatin (MYCOSTATIN) cream, Apply topically as needed, Disp: , Rfl:     olmesartan (BENICAR) 5 mg tablet, Take 5 mg by mouth daily, Disp: , Rfl:     predniSONE 5 mg tablet, Take 5 mg by mouth daily, Disp: , Rfl:     ursodiol (ACTIGALL) 300 mg capsule, Take 1 capsule by mouth 2 (two) times a day, Disp: , Rfl:       Vitals:    12/02/24 1435   BP: 128/70   Pulse: 90   SpO2: 95%     Weight (last 2 days)       Date/Time Weight    12/02/24 1435 61.7 (136)          Physical Exam  Constitutional:       General: He is not in acute distress.     Appearance: He is not diaphoretic.   HENT:      Head: Normocephalic and atraumatic.   Eyes:      General: No scleral icterus.     Conjunctiva/sclera: Conjunctivae normal.   Neck:      Vascular: No JVD.   Cardiovascular:      Rate and Rhythm: Normal rate and regular rhythm.      Heart sounds: Normal heart sounds. No murmur heard.  Pulmonary:      Effort: Pulmonary effort is normal. No respiratory distress.      Breath sounds: Normal breath sounds. No decreased breath sounds, wheezing, rhonchi or rales.   Musculoskeletal:      Cervical back: Normal range of motion.      Right lower leg: Normal. No edema.      Left lower leg: Normal. No edema.   Skin:     General: Skin is warm and dry.   Neurological:      Mental Status: He is alert and oriented to person, place, and time.           Laboratory Studies:  CMP:        Invalid input(s): \"ALBUMIN\"  NT-proBNP: " "No results found for: \"NTBNP\"   Coags:    Lipid Profile:   No results found for: \"CHOL\"  Lab Results   Component Value Date    HDL 64 11/27/2024     Lab Results   Component Value Date    LDLCALC 92 11/27/2024     Lab Results   Component Value Date    TRIG 118 11/27/2024       Cardiac testing:     ECG personally reviewed  No results found for this visit on 12/02/24.         stress Myoview   6/17 -   8 minutes of exercise, borderline ST segment changes, no ischemic symptoms, no perfusion abnormality, normal ejection fraction  1/22-Lexiscan Myoview stress test due to no new left bundle branch block, done at [facility], EF 51% with regions of fixed defects and hypokinesis suggesting infarct but no ischemia    John Haley MD    Portions of the record may have been created with voice recognition software.  Occasional wrong word or \"sound a like\" substitutions may have occurred due to the inherent limitations of voice recognition software.  Read the chart carefully and recognize, using context, where substitutions have occurred.  "

## 2025-04-18 ENCOUNTER — TELEPHONE (OUTPATIENT)
Age: 70
End: 2025-04-18

## 2025-04-18 NOTE — TELEPHONE ENCOUNTER
"Hello,    The following message was sent via e-mail to the leadership team:     Please advise if you can help facilitate the following overbook request:    Patient Name: Emanuel (patient)    Patient MRN: [id number]    Call back #: [phone] (Sania at [facility])    Insurance: [employer] O    Department:Cardiology    Speciality: General Cardiology    Reason for overbook request: STAT REFERRAL    Comments (Write \"N/a\" if no comments): Sania from patients PCP called from [facility] stating PCP Dr. Domenico Pate just faxed over to Dr. Haley office (Fax# used [phone]) NOTE: this fax # is on Cardio Daily Guide under Pulmonary Hypertension PH Patients section, Medical Records Fax. Sania states fax from Dr. Pate is an emergency referral to see cardiologist within 1 week. Sania states patient is Congestive Heart Failure patient. Sania requests a call back today to discuss.    Requested doctor and location: Dr. Eric. Last seen at [facility] location 12/2/24. Patient may consider any location based on the urgency. Patient lives in [facility].    Date of current appointment: 11/5/25 [facility] (appt was already scheduled)      Thank you.  "

## 2025-04-18 NOTE — TELEPHONE ENCOUNTER
Pt called back expecting to be seen this week since a STAT referral was sent. He became very upset when I did not have an available appt for this week and hung up the phone.

## 2025-04-18 NOTE — TELEPHONE ENCOUNTER
Caller: Sania with [facility]    Doctor: Dr. Haley    Call back #: [phone]    Reason for call: Sania with [facility] in [place] called back to follow up with our office about patient being seen ASAP. I told Sania that we did not receive the STAT referral yet for patient. She is re-faxing the referral at this time. Sania requested for a call back by today as the provider is requesting for a response.

## 2025-04-24 ENCOUNTER — OFFICE VISIT (OUTPATIENT)
Dept: CARDIOLOGY CLINIC | Facility: CLINIC | Age: 70
End: 2025-04-24
Payer: COMMERCIAL

## 2025-04-24 VITALS
WEIGHT: 139 LBS | DIASTOLIC BLOOD PRESSURE: 60 MMHG | HEART RATE: 72 BPM | HEIGHT: 66 IN | SYSTOLIC BLOOD PRESSURE: 110 MMHG | OXYGEN SATURATION: 97 % | BODY MASS INDEX: 22.34 KG/M2

## 2025-04-24 DIAGNOSIS — R55 SYNCOPE AND COLLAPSE: ICD-10-CM

## 2025-04-24 DIAGNOSIS — I44.7 LBBB (LEFT BUNDLE BRANCH BLOCK): ICD-10-CM

## 2025-04-24 DIAGNOSIS — I25.10 CORONARY ARTERY DISEASE INVOLVING NATIVE CORONARY ARTERY OF NATIVE HEART WITHOUT ANGINA PECTORIS: Primary | ICD-10-CM

## 2025-04-24 DIAGNOSIS — Z95.1 HX OF CABG: ICD-10-CM

## 2025-04-24 DIAGNOSIS — Z95.5 S/P DRUG ELUTING CORONARY STENT PLACEMENT: ICD-10-CM

## 2025-04-24 DIAGNOSIS — E78.5 HYPERLIPIDEMIA, UNSPECIFIED HYPERLIPIDEMIA TYPE: ICD-10-CM

## 2025-04-24 PROCEDURE — G2211 COMPLEX E/M VISIT ADD ON: HCPCS | Performed by: INTERNAL MEDICINE

## 2025-04-24 PROCEDURE — 93000 ELECTROCARDIOGRAM COMPLETE: CPT | Performed by: INTERNAL MEDICINE

## 2025-04-24 PROCEDURE — 99214 OFFICE O/P EST MOD 30 MIN: CPT | Performed by: INTERNAL MEDICINE

## 2025-04-24 NOTE — PROGRESS NOTES
Follow-up - Cardiology   Emanuel Naranjo 69 y.o. male MRN: [id number]      Impression    Bilateral leg edema  With his cardiac history he is at risk for CHF  Today no edema  As needed furosemide, but on Jardiance 25 mg daily from a diabetes standpoint  LVEF 50 to 55% with grade 1 diastolic dysfunction    CAD  Remote CABG and stenting  Last stress test was in 2017 and normal with an EF of 60%  Left bundle branch block first noted in 2021, unchanged on EKG today  No angina    CVA  6/23, cryptogenic, no residual deficits  Right-sided lesion, left-sided symptoms  Continues on Plavix per his PCP  Unable to take statins due to liver disease  Zio patch 7/23 negative for A-fib  No recurrent symptoms    Hypertension  Well-controlled    Syncope and collapse  In the context of choking, witnessed by his daughter  Swallowing workup currently being planned by his PCP  Will be undergoing a barium swallow with slight aspiration noticed on recent fluoroscopy  I suspect a vagal reaction, but we will plan a Zio patch due to his baseline conduction disease    Hyperlipidemia  Immune hepatitis precludes statin therapy per his hepatologist  Lipids remain relatively normal    ILD  Follows with pulmonary  He has chronic bibasilar Velcro-like crackles    Autoimmune hepatitis  Continues on CellCept, follows with hepatology    PLAN:    Zio patch x 1 week  Otherwise follow-up as scheduled in November    HPI: Emanuel Naranjo is a 69 y.o. 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     Recently had a choking event, he was drinking liquids.  This was witnessed, he lost consciousness and had a syncopal event in the setting.  He did not go to the hospital as instructed by EMS.  So his PCP, follow-up testing was planned.  He had an upper GI fluoroscopy which suggested a small amount  of aspiration.  Possible esophageal spasm, and a barium swallow has been arranged.  He has not had any other syncopal events outside of these kinds of triggers.  He does have left bundle branch block.  He has no other cardiac symptoms.  He does not have edema today.  He is taking Jardiance from a diabetes standpoint.  He is only using as needed furosemide.      Review of Systems   Constitutional:  Negative for appetite change, diaphoresis, fatigue and fever.   Respiratory:  Negative for chest tightness, shortness of breath and wheezing.    Cardiovascular:  Negative for chest pain, palpitations and leg swelling.   Gastrointestinal:  Negative for abdominal pain and blood in stool.   Musculoskeletal:  Negative for arthralgias and joint swelling.   Skin:  Negative for rash.   Neurological:  Positive for syncope. Negative for dizziness and light-headedness.         Past Medical History:   Diagnosis Date   • Autoimmune hepatitis (HCC)    • Diabetes mellitus (HCC)     Type 2    • Pneumonia      Social History     Substance and Sexual Activity   Alcohol Use No     Social History     Substance and Sexual Activity   Drug Use No     Social History     Tobacco Use   Smoking Status Some Days   • Current packs/day: 0.00   • Average packs/day: 0.3 packs/day for 35.0 years (8.8 ttl pk-yrs)   • Types: Cigarettes   • Start date: 1983   • Last attempt to quit: 2018   • Years since quittin.9   Smokeless Tobacco Never   Tobacco Comments    STILL SMOKING ABOUT 3 CIGARETTES A DAY        Allergies:  Allergies   Allergen Reactions   • Imuran [Azathioprine] Fever and Myalgia   • Lisinopril Abdominal Pain       Medications:     Current Outpatient Medications:   •  atenolol (TENORMIN) 50 mg tablet, TAKE 1 TABLET BY MOUTH  DAILY (Patient taking differently: 25 mg), Disp: 90 tablet, Rfl: 3  •  clopidogrel (PLAVIX) 75 mg tablet, Take 75 mg by mouth daily, Disp: , Rfl:   •  furosemide (LASIX) 20 mg tablet, Take 1 tablet (20 mg total)  by mouth daily, Disp: 30 tablet, Rfl: 3  •  glipiZIDE (GLUCOTROL XL) 10 mg 24 hr tablet, Take 20 mg by mouth daily, Disp: , Rfl:   •  ipratropium (ATROVENT) 0.06 % nasal spray, 2 sprays into each nostril 4 (four) times a day, Disp: 15 mL, Rfl: 6  •  Jardiance 25 MG TABS, Take 25 mg by mouth in the morning, Disp: , Rfl:   •  Multiple Vitamins-Minerals (multivitamin with minerals) tablet, Take 1 tablet by mouth daily, Disp: , Rfl:   •  mupirocin (BACTROBAN) 2 % ointment, , Disp: , Rfl:   •  mycophenolate (CELLCEPT) 500 mg tablet, Take 1,000 mg by mouth 2 (two) times a day, Disp: , Rfl: 0  •  nystatin (MYCOSTATIN) cream, Apply topically as needed, Disp: , Rfl:   •  olmesartan (BENICAR) 5 mg tablet, Take 5 mg by mouth daily, Disp: , Rfl:   •  predniSONE 5 mg tablet, Take 5 mg by mouth daily, Disp: , Rfl:   •  ursodiol (ACTIGALL) 300 mg capsule, Take 1 capsule by mouth 2 (two) times a day, Disp: , Rfl:       Vitals:    04/24/25 1021   BP: 110/60   Pulse: 72   SpO2: 97%     Weight (last 2 days)     Date/Time Weight    04/24/25 1021 63 (139)        Physical Exam  Constitutional:       General: He is not in acute distress.     Appearance: He is not diaphoretic.   HENT:      Head: Normocephalic and atraumatic.   Eyes:      General: No scleral icterus.     Conjunctiva/sclera: Conjunctivae normal.   Neck:      Vascular: No JVD.   Cardiovascular:      Rate and Rhythm: Normal rate and regular rhythm.      Heart sounds: Normal heart sounds. No murmur heard.  Pulmonary:      Effort: Pulmonary effort is normal. No respiratory distress.      Breath sounds: Normal breath sounds. No decreased breath sounds, wheezing, rhonchi or rales.   Musculoskeletal:      Cervical back: Normal range of motion.      Right lower leg: Normal. No edema.      Left lower leg: Normal. No edema.   Skin:     General: Skin is warm and dry.   Neurological:      Mental Status: He is alert and oriented to person, place, and time.           Laboratory  "Studies:  CMP:        Invalid input(s): \"ALBUMIN\"  NT-proBNP: No results found for: \"NTBNP\"   Coags:    Lipid Profile:   No results found for: \"CHOL\"  Lab Results   Component Value Date    HDL 64 11/27/2024     Lab Results   Component Value Date    LDLCALC 92 11/27/2024     Lab Results   Component Value Date    TRIG 118 11/27/2024       Cardiac testing:     ECG personally reviewed  Results for orders placed or performed in visit on 04/24/25   POCT ECG    Impression    Sinus rhythm, left bundle branch block            stress Myoview   6/17 -   8 minutes of exercise, borderline ST segment changes, no ischemic symptoms, no perfusion abnormality, normal ejection fraction  1/22-Lexiscan Myoview stress test due to no new left bundle branch block, done at [facility], EF 51% with regions of fixed defects and hypokinesis suggesting infarct but no ischemia    John Haley MD    Portions of the record may have been created with voice recognition software.  Occasional wrong word or \"sound a like\" substitutions may have occurred due to the inherent limitations of voice recognition software.  Read the chart carefully and recognize, using context, where substitutions have occurred.  "

## 2025-05-08 LAB
CV ZIO BASELINE AVG BPM: 76 BPM
CV ZIO BASELINE BPM HIGH: 154 BPM
CV ZIO BASELINE BPM LOW: 52 BPM
CV ZIO DEVICE ANALYSIS TIME: NORMAL
CV ZIO ECT SVE COUNT: 3325 EPISODES
CV ZIO ECT SVE CPLT COUNT: 99 EPISODES
CV ZIO ECT SVE CPLT FREQ: NORMAL
CV ZIO ECT SVE FREQ: NORMAL
CV ZIO ECT SVE TPLT COUNT: 70 EPISODES
CV ZIO ECT SVE TPLT FREQ: NORMAL
CV ZIO ECT VE COUNT: NORMAL EPISODES
CV ZIO ECT VE CPLT COUNT: 286 EPISODES
CV ZIO ECT VE CPLT FREQ: NORMAL
CV ZIO ECT VE FREQ: NORMAL
CV ZIO ECT VE TPLT COUNT: 0 EPISODES
CV ZIO ECT VE TPLT FREQ: 0
CV ZIO ECTOPIC SVE COUPLET RAW PERCENT: 0.02 %
CV ZIO ECTOPIC SVE ISOLATED PERCENT: 0.42 %
CV ZIO ECTOPIC SVE TRIPLET RAW PERCENT: 0.03 %
CV ZIO ECTOPIC VE COUPLET RAW PERCENT: 0.07 %
CV ZIO ECTOPIC VE ISOLATED PERCENT: 1.95 %
CV ZIO ECTOPIC VE TRIPLET RAW PERCENT: 0 %
CV ZIO ENROLLMENT END: NORMAL
CV ZIO ENROLLMENT START: NORMAL
CV ZIO L BIGEMINY DUR: 10.3 SEC
CV ZIO L BIGEMINY END: NORMAL
CV ZIO L BIGEMINY START: NORMAL
CV ZIO L TRIGEMINY DUR: 10.2 SEC
CV ZIO L TRIGEMINY END: NORMAL
CV ZIO L TRIGEMINY START: NORMAL
CV ZIO PATIENT EVENTS DIARIES: 0
CV ZIO PATIENT EVENTS TRIGGERS: 0
CV ZIO PAUSE COUNT: 0
CV ZIO PRESCRIPTION STATUS: NORMAL
CV ZIO SVT AVG BPM: 113 BPM
CV ZIO SVT BPM HIGH: 152 BPM
CV ZIO SVT BPM LOW: 83 BPM
CV ZIO SVT COUNT: 26
CV ZIO SVT F EPI AVG BPM: 141 BPM
CV ZIO SVT F EPI BEATS: 9 BEATS
CV ZIO SVT F EPI BPM HIGH: 152 BPM
CV ZIO SVT F EPI BPM LOW: 118 BPM
CV ZIO SVT F EPI DUR: 3.8 SEC
CV ZIO SVT F EPI END: NORMAL
CV ZIO SVT F EPI START: NORMAL
CV ZIO SVT L EPI AVG BPM: 107 BPM
CV ZIO SVT L EPI BEATS: 9 BEATS
CV ZIO SVT L EPI BPM HIGH: 120 BPM
CV ZIO SVT L EPI BPM LOW: 99 BPM
CV ZIO SVT L EPI DUR: 4.7 SEC
CV ZIO SVT L EPI END: NORMAL
CV ZIO SVT L EPI START: NORMAL
CV ZIO TOTAL  ENROLLMENT PERIOD: NORMAL
CV ZIO VT AVG BPM: 109 BPM
CV ZIO VT BPM HIGH: 154 BPM
CV ZIO VT BPM LOW: 52 BPM
CV ZIO VT COUNT: 1
CV ZIO VT F EPI AVG BPM: 109
CV ZIO VT F EPI BEATS: 4 BEATS
CV ZIO VT F EPI BPM HIGH: 154
CV ZIO VT F EPI BPM LOW: 52
CV ZIO VT F EPI DUR: 2.7 SEC
CV ZIO VT F EPI END: NORMAL
CV ZIO VT F EPI START: NORMAL
CV ZIO VT L EPI AVG BPM: 109
CV ZIO VT L EPI BEATS: 4 BEATS
CV ZIO VT L EPI BPM HIGH: 154 BPM
CV ZIO VT L EPI BPM LOW: 52 BPM
CV ZIO VT L EPI DUR: 2.7
CV ZIO VT L EPI END: NORMAL
CV ZIO VT L EPI START: NORMAL

## 2025-06-13 ENCOUNTER — RESULTS FOLLOW-UP (OUTPATIENT)
Dept: CARDIOLOGY CLINIC | Facility: CLINIC | Age: 70
End: 2025-06-13

## 2025-07-07 ENCOUNTER — APPOINTMENT (OUTPATIENT)
Dept: RADIOLOGY | Facility: MEDICAL CENTER | Age: 70
End: 2025-07-07
Attending: PHYSICIAN ASSISTANT
Payer: COMMERCIAL

## 2025-07-07 DIAGNOSIS — S69.91XA INJURY OF RIGHT WRIST, INITIAL ENCOUNTER: ICD-10-CM

## 2025-07-07 PROCEDURE — 73110 X-RAY EXAM OF WRIST: CPT

## 2025-07-13 ENCOUNTER — TELEPHONE (OUTPATIENT)
Dept: OTHER | Facility: OTHER | Age: 70
End: 2025-07-13

## 2025-07-13 NOTE — TELEPHONE ENCOUNTER
"Patient stated, \"I ended up in the hospital and would like my Cardiologist to be made aware and review results.\"    Advised patient that all inpatient communication has to be done through current care team. Patient will request through his nurse that he'd like his cardiologist to be contacted.   "

## 2025-07-14 ENCOUNTER — TELEPHONE (OUTPATIENT)
Dept: CARDIOLOGY CLINIC | Facility: MEDICAL CENTER | Age: 70
End: 2025-07-14

## 2025-07-15 ENCOUNTER — TELEPHONE (OUTPATIENT)
Age: 70
End: 2025-07-15

## 2025-07-17 ENCOUNTER — OFFICE VISIT (OUTPATIENT)
Dept: CARDIOLOGY CLINIC | Facility: CLINIC | Age: 70
End: 2025-07-17
Payer: COMMERCIAL

## 2025-07-17 VITALS
SYSTOLIC BLOOD PRESSURE: 120 MMHG | OXYGEN SATURATION: 94 % | HEIGHT: 66 IN | DIASTOLIC BLOOD PRESSURE: 70 MMHG | WEIGHT: 144.2 LBS | BODY MASS INDEX: 23.18 KG/M2 | HEART RATE: 73 BPM

## 2025-07-17 DIAGNOSIS — I25.10 CORONARY ARTERY DISEASE INVOLVING NATIVE CORONARY ARTERY OF NATIVE HEART WITHOUT ANGINA PECTORIS: ICD-10-CM

## 2025-07-17 DIAGNOSIS — I65.23 BILATERAL CAROTID ARTERY STENOSIS: ICD-10-CM

## 2025-07-17 DIAGNOSIS — Z95.1 HX OF CABG: ICD-10-CM

## 2025-07-17 DIAGNOSIS — Z86.73 HISTORY OF STROKE: ICD-10-CM

## 2025-07-17 DIAGNOSIS — E78.5 HYPERLIPIDEMIA, UNSPECIFIED HYPERLIPIDEMIA TYPE: ICD-10-CM

## 2025-07-17 DIAGNOSIS — E11.9 TYPE 2 DIABETES MELLITUS WITHOUT COMPLICATION, WITHOUT LONG-TERM CURRENT USE OF INSULIN (HCC): ICD-10-CM

## 2025-07-17 DIAGNOSIS — I10 BENIGN ESSENTIAL HYPERTENSION: ICD-10-CM

## 2025-07-17 DIAGNOSIS — I44.7 LBBB (LEFT BUNDLE BRANCH BLOCK): ICD-10-CM

## 2025-07-17 DIAGNOSIS — I42.9 CARDIOMYOPATHY, UNSPECIFIED TYPE (HCC): Primary | ICD-10-CM

## 2025-07-17 DIAGNOSIS — K75.4 AUTOIMMUNE HEPATITIS (HCC): ICD-10-CM

## 2025-07-17 DIAGNOSIS — I42.9 CARDIOMYOPATHY, UNSPECIFIED TYPE (HCC): ICD-10-CM

## 2025-07-17 PROCEDURE — 99215 OFFICE O/P EST HI 40 MIN: CPT | Performed by: INTERNAL MEDICINE

## 2025-07-17 PROCEDURE — G2211 COMPLEX E/M VISIT ADD ON: HCPCS | Performed by: INTERNAL MEDICINE

## 2025-07-17 RX ORDER — DOXYCYCLINE HYCLATE 100 MG
100 TABLET ORAL 2 TIMES DAILY
Status: ON HOLD | COMMUNITY
Start: 2025-07-15 | End: 2025-07-23 | Stop reason: ALTCHOICE

## 2025-07-17 RX ORDER — SACUBITRIL AND VALSARTAN 24; 26 MG/1; MG/1
1 TABLET, FILM COATED ORAL 2 TIMES DAILY
Qty: 180 TABLET | Refills: 3 | Status: SHIPPED | OUTPATIENT
Start: 2025-07-17

## 2025-07-17 RX ORDER — TRAMADOL HYDROCHLORIDE 50 MG/1
50 TABLET ORAL EVERY 6 HOURS PRN
COMMUNITY

## 2025-07-17 RX ORDER — SACUBITRIL AND VALSARTAN 24; 26 MG/1; MG/1
1 TABLET, FILM COATED ORAL 2 TIMES DAILY
Qty: 60 TABLET | Refills: 6 | Status: SHIPPED | OUTPATIENT
Start: 2025-07-17 | End: 2025-07-17 | Stop reason: SDUPTHER

## 2025-07-17 NOTE — TELEPHONE ENCOUNTER
Patient wife call the med refill line to say that she is very upset because at 3:00 pm today she spoke to someone in the office about how the provider sent the patient's script to the wrong pharmacy, and the person she spoke to in the office said that his script would be at the [facility] pharmacy in an hour, she called the pharmacy and the script was not there, now she said that she has to call [facility] to cancel the script from them because they already start to process the script, but I tried to explain that the script was discontinued but she was not hearing what I had to say, she said that she is going to call [facility] and that the people in the office to be trained better and hung up on me

## 2025-07-17 NOTE — PROGRESS NOTES
Follow-up - Cardiology   Emanuel Naranjo 70 y.o. male MRN: [id number]      Impression    Cardiomyopathy  Last EF 2023 [facility] 55%  Recently hospitalized for a wrist injury, underwent echo for unclear reasons, LVEF reportedly 25%  Unclear cause of his drop in EF  Zio patch in 2023 and 2025 did not disclose any major arrhythmias.  Longstanding CAD, remote CABG, prior stenting, left bundle branch block, and not on statin therapy due to autoimmune hepatitis and contraindication per his hepatologist.  Also had Covid type illness early 2025 raising the possibility of a viral CMP    CAD  Remote CABG and stenting  Last stress test was in 2017 and normal with an EF of 60%  Left bundle branch block first noted in 2021, unchanged on EKG today  No angina    CVA  6/23, cryptogenic, no residual deficits  Right-sided lesion, left-sided symptoms  Continues on Plavix per his PCP  Unable to take statins due to liver disease  Zio patch 7/23 negative for A-fib  No recurrent symptoms    Hypertension  Well-controlled    Syncope and collapse  In the context of choking, witnessed by his daughter  Swallowing workup currently being planned by his PCP  Will be undergoing a barium swallow with slight aspiration noticed on recent fluoroscopy  I suspect a vagal reaction, but we will plan a Zio patch due to his baseline conduction disease    Hyperlipidemia  Immune hepatitis precludes statin therapy per his hepatologist  Lipids remain relatively normal    ILD  Follows with pulmonary  He has chronic bibasilar Velcro-like crackles    Autoimmune hepatitis  Continues on CellCept, follows with hepatology    PLAN:    Repeat echo, if LVEF<45, will move ahead with Cath and have discussed the risks and benefits of doing so.  He and his wife both agree with the plan.  He is on atenolol, furosemide, jardiance.  He is on Olmesartan, will stop and switch to Entresto    HPI: Emanuel Naranjo is a 70 y.o. 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     He was recently hospitalized for sepsis, found to have low EF, and he requested ASAP anumal with me for evaluation.  He has had no angina, but has mild CHF type symptoms, but did have a viral illness early .  BP is stable, lipids not ideal but not on statins due to autoimmune hepatitis.    Review of Systems   Constitutional:  Negative for appetite change, diaphoresis, fatigue and fever.   Respiratory:  Positive for shortness of breath. Negative for chest tightness and wheezing.    Cardiovascular:  Negative for chest pain, palpitations and leg swelling.   Gastrointestinal:  Negative for abdominal pain and blood in stool.   Musculoskeletal:  Negative for arthralgias and joint swelling.   Skin:  Negative for rash.   Neurological:  Negative for dizziness, syncope and light-headedness.         Past Medical History:   Diagnosis Date   • Autoimmune hepatitis (HCC)    • Diabetes mellitus (HCC)     Type 2    • Pneumonia      Social History     Substance and Sexual Activity   Alcohol Use No     Social History     Substance and Sexual Activity   Drug Use No     Social History     Tobacco Use   Smoking Status Some Days   • Current packs/day: 0.00   • Average packs/day: 0.3 packs/day for 35.0 years (8.8 ttl pk-yrs)   • Types: Cigarettes   • Start date: 1983   • Last attempt to quit: 2018   • Years since quittin.2   Smokeless Tobacco Never   Tobacco Comments    STILL SMOKING ABOUT 3 CIGARETTES A DAY        Allergies:  Allergies   Allergen Reactions   • Imuran [Azathioprine] Fever and Myalgia   • Lisinopril Abdominal Pain       Medications:     Current Outpatient Medications:   •  atenolol (TENORMIN) 50 mg tablet, TAKE 1 TABLET BY MOUTH  DAILY (Patient taking differently: 25 mg), Disp: 90 tablet, Rfl: 3  •  clopidogrel (PLAVIX) 75 mg  tablet, Take 75 mg by mouth in the morning., Disp: , Rfl:   •  doxycycline hyclate (VIBRA-TABS) 100 mg tablet, Take 100 mg by mouth 2 (two) times a day as directed for 5 days, Disp: , Rfl:   •  furosemide (LASIX) 20 mg tablet, Take 1 tablet (20 mg total) by mouth daily, Disp: 30 tablet, Rfl: 3  •  glipiZIDE (GLUCOTROL XL) 10 mg 24 hr tablet, Take 20 mg by mouth in the morning., Disp: , Rfl:   •  ipratropium (ATROVENT) 0.06 % nasal spray, 2 sprays into each nostril 4 (four) times a day, Disp: 15 mL, Rfl: 6  •  Jardiance 25 MG TABS, Take 25 mg by mouth in the morning, Disp: , Rfl:   •  Multiple Vitamins-Minerals (multivitamin with minerals) tablet, Take 1 tablet by mouth in the morning., Disp: , Rfl:   •  mupirocin (BACTROBAN) 2 % ointment, , Disp: , Rfl:   •  mycophenolate (CELLCEPT) 500 mg tablet, Take 500 mg by mouth in the morning and 500 mg in the evening., Disp: , Rfl: 0  •  nystatin (MYCOSTATIN) cream, Apply topically as needed, Disp: , Rfl:   •  predniSONE 5 mg tablet, Take 5 mg by mouth in the morning., Disp: , Rfl:   •  ursodiol (ACTIGALL) 300 mg capsule, Take 1 capsule by mouth in the morning and 1 capsule in the evening., Disp: , Rfl:   •  sacubitril-valsartan (Entresto) 24-26 MG TABS, Take 1 tablet by mouth 2 (two) times a day, Disp: 180 tablet, Rfl: 3  •  traMADol (ULTRAM) 50 mg tablet, Take 50 mg by mouth every 6 (six) hours as needed for 5 days, Disp: , Rfl:       Vitals:    07/17/25 1344   BP: 120/70   Pulse: 73   SpO2: 94%     Weight (last 2 days)     None        Physical Exam  Constitutional:       General: He is not in acute distress.     Appearance: Normal appearance. He is well-developed. He is not diaphoretic.   HENT:      Head: Normocephalic and atraumatic.     Eyes:      General: No scleral icterus.     Conjunctiva/sclera: Conjunctivae normal.      Pupils: Pupils are equal, round, and reactive to light.     Neck:      Thyroid: No thyromegaly.      Vascular: No JVD.     Cardiovascular:       "Rate and Rhythm: Normal rate and regular rhythm.      Heart sounds: Normal heart sounds. No murmur heard.     No friction rub. No gallop.   Pulmonary:      Effort: Pulmonary effort is normal. No respiratory distress.      Breath sounds: Normal breath sounds. No wheezing or rales.   Abdominal:      General: Bowel sounds are normal. There is no distension.      Palpations: Abdomen is soft.      Tenderness: There is no abdominal tenderness.     Musculoskeletal:         General: No deformity. Normal range of motion.      Cervical back: Normal range of motion and neck supple.      Right lower leg: No edema.      Left lower leg: No edema.     Skin:     General: Skin is warm and dry.      Findings: No erythema or rash.     Neurological:      General: No focal deficit present.      Mental Status: He is alert and oriented to person, place, and time.      Cranial Nerves: No cranial nerve deficit.      Motor: No weakness.           Laboratory Studies:  CMP:        Invalid input(s): \"ALBUMIN\"  NT-proBNP: No results found for: \"NTBNP\"   Coags:    Lipid Profile:   No results found for: \"CHOL\"  Lab Results   Component Value Date    HDL 64 11/27/2024     Lab Results   Component Value Date    LDLCALC 92 11/27/2024     Lab Results   Component Value Date    TRIG 118 11/27/2024       Cardiac testing:     ECG personally reviewed  No results found for this visit on 07/17/25.           stress Myoview   6/17 -   8 minutes of exercise, borderline ST segment changes, no ischemic symptoms, no perfusion abnormality, normal ejection fraction  1/22-Lexiscan Myoview stress test due to no new left bundle branch block, done at [facility], EF 51% with regions of fixed defects and hypokinesis suggesting infarct but no ischemia    John Haley MD    Portions of the record may have been created with voice recognition software.  Occasional wrong word or \"sound a like\" substitutions may have occurred due to the inherent limitations of " voice recognition software.  Read the chart carefully and recognize, using context, where substitutions have occurred.    I have spent a total time of 42 minutes in caring for this patient on the day of the visit/encounter including Diagnostic results, Risks and benefits of tx options, Instructions for management, Patient and family education, Importance of tx compliance, Risk factor reductions, Documenting in the medical record, and Reviewing/placing orders in the medical record (including tests, medications, and/or procedures).

## 2025-07-17 NOTE — PATIENT INSTRUCTIONS
Please start Entresto this evening  Please stop olmesartan completely  We will get an echo expedited  If your heart function/ejection fraction is greater than 40%, we will not move ahead with a heart catheterization  If your ejection fraction is less than 40%, we will keep the planned cardiac catheterization.  Your left bundle branch block abnormal heart conduction, your flu/virus earlier this year in March or other explanations in case we do not find any blockage to explain the weak heart muscle.  Entresto and medicines like this are designed to try to help improve the heart muscle.

## 2025-07-18 ENCOUNTER — TELEPHONE (OUTPATIENT)
Dept: CARDIOLOGY CLINIC | Facility: CLINIC | Age: 70
End: 2025-07-18

## 2025-07-18 ENCOUNTER — HOSPITAL ENCOUNTER (OUTPATIENT)
Dept: NON INVASIVE DIAGNOSTICS | Facility: HOSPITAL | Age: 70
Discharge: HOME | End: 2025-07-18
Attending: INTERNAL MEDICINE
Payer: COMMERCIAL

## 2025-07-18 VITALS
WEIGHT: 144 LBS | SYSTOLIC BLOOD PRESSURE: 120 MMHG | HEART RATE: 76 BPM | BODY MASS INDEX: 23.14 KG/M2 | HEIGHT: 66 IN | DIASTOLIC BLOOD PRESSURE: 70 MMHG

## 2025-07-18 DIAGNOSIS — I42.9 CARDIOMYOPATHY, UNSPECIFIED TYPE (HCC): ICD-10-CM

## 2025-07-18 LAB
AORTIC ROOT: 3.2 CM
ASCENDING AORTA: 3.3 CM
BSA FOR ECHO PROCEDURE: 1.74 M2
E WAVE DECELERATION TIME: 199 MS
E/A RATIO: 0.88
FRACTIONAL SHORTENING: 18 (ref 28–44)
INTERVENTRICULAR SEPTUM IN DIASTOLE (PARASTERNAL SHORT AXIS VIEW): 0.8 CM
INTERVENTRICULAR SEPTUM: 0.8 CM (ref 0.6–1.1)
LAAS-AP2: 29.6 CM2
LAAS-AP4: 20.9 CM2
LEFT ATRIUM SIZE: 4 CM
LEFT ATRIUM VOLUME (MOD BIPLANE): 81 ML
LEFT ATRIUM VOLUME INDEX (MOD BIPLANE): 46.6 ML/M2
LEFT INTERNAL DIMENSION IN SYSTOLE: 4.7 CM (ref 2.1–4)
LEFT VENTRICLE DIASTOLIC VOLUME (MOD BIPLANE): 194 ML
LEFT VENTRICLE DIASTOLIC VOLUME INDEX (MOD BIPLANE): 111.5 ML/M2
LEFT VENTRICLE SYSTOLIC VOLUME (MOD BIPLANE): 138 ML
LEFT VENTRICLE SYSTOLIC VOLUME INDEX (MOD BIPLANE): 79.3 ML/M2
LEFT VENTRICULAR INTERNAL DIMENSION IN DIASTOLE: 5.7 CM (ref 3.5–6)
LEFT VENTRICULAR POSTERIOR WALL IN END DIASTOLE: 1.1 CM
LEFT VENTRICULAR STROKE VOLUME: 55 ML
LV EF BIPLANE MOD: 29 %
LV EF US.2D.A4C+ESTIMATED: 28 %
LVSV (TEICH): 55 ML
MITRAL REGURGITATION PEAK VELOCITY: 5.44 M/S
MITRAL VALVE MEAN INFLOW VELOCITY: 3.77 M/S
MITRAL VALVE REGURGITANT PEAK GRADIENT: 119 MMHG
MV E'TISSUE VEL-SEP: 5 CM/S
MV PEAK A VEL: 0.82 M/S
MV PEAK E VEL: 72 CM/S
MV STENOSIS PRESSURE HALF TIME: 58 MS
MV VALVE AREA P 1/2 METHOD: 3.79
RA PRESSURE ESTIMATED: 15 MMHG
RIGHT ATRIUM AREA SYSTOLE A4C: 18.8 CM2
RIGHT VENTRICLE ID DIMENSION: 4 CM
RV PSP: 79 MMHG
SL CV DOP CALC MV VTI RETROGRADE: 185.1 CM
SL CV LEFT ATRIUM LENGTH A2C: 5.8 CM
SL CV LV EF: 35
SL CV MV MEAN GRADIENT RETROGRADE: 68 MMHG
SL CV PED ECHO LEFT VENTRICLE DIASTOLIC VOLUME (MOD BIPLANE) 2D: 158 ML
SL CV PED ECHO LEFT VENTRICLE SYSTOLIC VOLUME (MOD BIPLANE) 2D: 102 ML
TR MAX PG: 64 MMHG
TR PEAK VELOCITY: 4 M/S
TRICUSPID ANNULAR PLANE SYSTOLIC EXCURSION: 1 CM
TRICUSPID VALVE PEAK REGURGITATION VELOCITY: 4.01 M/S

## 2025-07-18 PROCEDURE — C8929 TTE W OR WO FOL WCON,DOPPLER: HCPCS

## 2025-07-18 PROCEDURE — 93306 TTE W/DOPPLER COMPLETE: CPT | Performed by: INTERNAL MEDICINE

## 2025-07-18 RX ADMIN — PERFLUTREN 0.6 ML/MIN: 6.52 INJECTION, SUSPENSION INTRAVENOUS at 10:58

## 2025-07-18 NOTE — TELEPHONE ENCOUNTER
Patient scheduled for LEFT Heart Cath on 7/29/25 at [facility] with Dr. JAMES.      Instructions sent to patient through [employer].      Patient aware of all general instructions.    Medication holds:   Jardiance - Do not take for 4 days before and morning of procedure.     Glipizide - Take your regular dose day/evening before procedure and do not take morning of procedure.      Furosemide (Lasix) - Do not take morning of procedure.     Labs to be done on 7/10/25  CMP / CBC (fasting 8 hours)

## 2025-07-18 NOTE — LETTER
2025       Emanuel Naranjo              : 1955        MRN: [id number]  [address] [id number]       Procedure Name: PCI    Procedure date: 25    Location: [facility]      The hospital will contact you the day prior to your procedure, usually between 4PM - 6PM to instruct you on the time and place to report. If you do not hear from a [facility]  by 6PM the evening prior to your procedure, please contact the campus that you are scheduled at.      [place]: [address] [place] [address] - [place] [address] [place] [phone]       You may have NOTHING to eat or drink from midnight the night prior to your procedure. You may have a minimal amount of water with your morning medications.     DO NOT stop taking Plavix or Aspirin unless advised otherwise.    Arrange for a responsible person to drive you to and from the hospital.    Please shower/bathe the night before your procedure and do not use powders or lotions.    Please notify us if you have been admitted to the hospital within the past 30 days.    Bring a list of daily medications, vitamins, minerals, herbals and nutritional supplements you take. Include dosage and time you take them each day.    If packing an overnight bag, pack minimal clothing, you will be given hospital sleepwear. Do not bring money, valuables or jewelry. Wedding band is OK.    If you use CPAP machine, bring it to the hospital.      Have your Photo ID and Insurance cards with you.    DO NOT take any diabetic medication, including insulin, the morning of the procedure. Oral diabetic medications may include: Glucophage, Prandin, Glyburide, Micronase, Avandia, Glocovance, Precose, Glynase y Starlix.          You should continue to take your morning dose of heart and/or blood pressure medications with a sip of water UNLESS ADVISED OTHERWISE.    Special Instructions:    Medication hold:     Jardiance - Do not take for 4 days before and  morning of procedure.  (PT WIFE STATED HE IS NO LONGER TAKING)    Glipizide - Take your regular dose day/evening before procedure and do not take morning of procedure.      Eliquis - Do not take the night before and morning of procedure.     Furosemide (Lasix) - Do not take morning of procedure.       Thank you,  Mariia Fernandez  Surgery Coordinator  [facility]   [address] [place] [id number]  Teams: [phone]

## 2025-07-18 NOTE — TELEPHONE ENCOUNTER
Dr. Haley, can we move forward with scheduling pt cath? Please advise.     Thank you,   Mariia

## 2025-07-18 NOTE — TELEPHONE ENCOUNTER
Patient called back to schedule but I was not able to connect him with anyone. Please call the patient to schedule.

## 2025-07-18 NOTE — TELEPHONE ENCOUNTER
Left voicemail on machine informing patient to call and schedule a LEFT Heart Cath procedure.     Thanks,  Mariia Fernandez

## 2025-07-23 ENCOUNTER — TELEPHONE (OUTPATIENT)
Dept: NEPHROLOGY | Facility: CLINIC | Age: 70
End: 2025-07-23

## 2025-07-23 ENCOUNTER — HOSPITAL ENCOUNTER (INPATIENT)
Facility: HOSPITAL | Age: 70
LOS: 4 days | Discharge: HOME | DRG: 286 | End: 2025-07-27
Admitting: INTERNAL MEDICINE
Payer: COMMERCIAL

## 2025-07-23 ENCOUNTER — APPOINTMENT (EMERGENCY)
Dept: RADIOLOGY | Facility: HOSPITAL | Age: 70
DRG: 286 | End: 2025-07-23
Payer: COMMERCIAL

## 2025-07-23 DIAGNOSIS — I25.810 CORONARY ARTERY DISEASE INVOLVING CORONARY BYPASS GRAFT OF NATIVE HEART WITHOUT ANGINA PECTORIS: ICD-10-CM

## 2025-07-23 DIAGNOSIS — E83.42 HYPOMAGNESEMIA: ICD-10-CM

## 2025-07-23 DIAGNOSIS — I50.23 ACUTE ON CHRONIC HFREF (HEART FAILURE WITH REDUCED EJECTION FRACTION) (HCC): ICD-10-CM

## 2025-07-23 DIAGNOSIS — Z72.0 TOBACCO ABUSE: ICD-10-CM

## 2025-07-23 DIAGNOSIS — I42.9 CARDIOMYOPATHY, UNSPECIFIED TYPE (HCC): ICD-10-CM

## 2025-07-23 DIAGNOSIS — R79.89 ELEVATED TROPONIN: ICD-10-CM

## 2025-07-23 DIAGNOSIS — I48.91 ATRIAL FIBRILLATION WITH RAPID VENTRICULAR RESPONSE (HCC): ICD-10-CM

## 2025-07-23 DIAGNOSIS — K75.4 AUTOIMMUNE HEPATITIS (HCC): ICD-10-CM

## 2025-07-23 DIAGNOSIS — R07.9 CHEST PAIN: Primary | ICD-10-CM

## 2025-07-23 DIAGNOSIS — E87.70 FLUID OVERLOAD: ICD-10-CM

## 2025-07-23 DIAGNOSIS — I50.9 CHF (CONGESTIVE HEART FAILURE) (HCC): ICD-10-CM

## 2025-07-23 PROBLEM — I50.20 HFREF (HEART FAILURE WITH REDUCED EJECTION FRACTION) (HCC): Status: ACTIVE | Noted: 2025-07-23

## 2025-07-23 LAB
2HR DELTA HS TROPONIN: 1563 NG/L
4HR DELTA HS TROPONIN: 3890 NG/L
ALBUMIN SERPL BCG-MCNC: 3.9 G/DL (ref 3.5–5)
ALP SERPL-CCNC: 39 U/L (ref 34–104)
ALT SERPL W P-5'-P-CCNC: 10 U/L (ref 7–52)
ANION GAP SERPL CALCULATED.3IONS-SCNC: 11 MMOL/L (ref 4–13)
APTT PPP: 193 SECONDS (ref 23–34)
APTT PPP: 29 SECONDS (ref 23–34)
AST SERPL W P-5'-P-CCNC: 18 U/L (ref 13–39)
BASOPHILS # BLD AUTO: 0.04 THOUSANDS/ÂΜL (ref 0–0.1)
BASOPHILS NFR BLD AUTO: 1 % (ref 0–1)
BILIRUB SERPL-MCNC: 1.2 MG/DL (ref 0.2–1)
BNP SERPL-MCNC: 1931 PG/ML (ref 0–100)
BUN SERPL-MCNC: 21 MG/DL (ref 5–25)
CALCIUM SERPL-MCNC: 9.6 MG/DL (ref 8.4–10.2)
CARDIAC TROPONIN I PNL SERPL HS: 1650 NG/L (ref ?–50)
CARDIAC TROPONIN I PNL SERPL HS: 3977 NG/L (ref ?–50)
CARDIAC TROPONIN I PNL SERPL HS: 87 NG/L (ref ?–50)
CHLORIDE SERPL-SCNC: 99 MMOL/L (ref 96–108)
CO2 SERPL-SCNC: 28 MMOL/L (ref 21–32)
CREAT SERPL-MCNC: 0.78 MG/DL (ref 0.6–1.3)
EOSINOPHIL # BLD AUTO: 0.13 THOUSAND/ÂΜL (ref 0–0.61)
EOSINOPHIL NFR BLD AUTO: 2 % (ref 0–6)
ERYTHROCYTE [DISTWIDTH] IN BLOOD BY AUTOMATED COUNT: 14.6 % (ref 11.6–15.1)
ERYTHROCYTE [DISTWIDTH] IN BLOOD BY AUTOMATED COUNT: 14.6 % (ref 11.6–15.1)
GFR SERPL CREATININE-BSD FRML MDRD: 90 ML/MIN/1.73SQ M
GLUCOSE SERPL-MCNC: 283 MG/DL (ref 65–140)
HCT VFR BLD AUTO: 45.5 % (ref 36.5–49.3)
HCT VFR BLD AUTO: 53.5 % (ref 36.5–49.3)
HGB BLD-MCNC: 15.2 G/DL (ref 12–17)
HGB BLD-MCNC: 17.8 G/DL (ref 12–17)
IMM GRANULOCYTES # BLD AUTO: 0.03 THOUSAND/UL (ref 0–0.2)
IMM GRANULOCYTES NFR BLD AUTO: 0 % (ref 0–2)
INR PPP: 1.16 (ref 0.85–1.19)
INR PPP: 1.32 (ref 0.85–1.19)
LYMPHOCYTES # BLD AUTO: 1.36 THOUSANDS/ÂΜL (ref 0.6–4.47)
LYMPHOCYTES NFR BLD AUTO: 18 % (ref 14–44)
MAGNESIUM SERPL-MCNC: 1.7 MG/DL (ref 1.9–2.7)
MCH RBC QN AUTO: 30.7 PG (ref 26.8–34.3)
MCH RBC QN AUTO: 30.8 PG (ref 26.8–34.3)
MCHC RBC AUTO-ENTMCNC: 33.3 G/DL (ref 31.4–37.4)
MCHC RBC AUTO-ENTMCNC: 33.4 G/DL (ref 31.4–37.4)
MCV RBC AUTO: 92 FL (ref 82–98)
MCV RBC AUTO: 93 FL (ref 82–98)
MONOCYTES # BLD AUTO: 0.76 THOUSAND/ÂΜL (ref 0.17–1.22)
MONOCYTES NFR BLD AUTO: 10 % (ref 4–12)
NEUTROPHILS # BLD AUTO: 5.29 THOUSANDS/ÂΜL (ref 1.85–7.62)
NEUTS SEG NFR BLD AUTO: 69 % (ref 43–75)
NRBC BLD AUTO-RTO: 0 /100 WBCS
PLATELET # BLD AUTO: 141 THOUSANDS/UL (ref 149–390)
PLATELET # BLD AUTO: 149 THOUSANDS/UL (ref 149–390)
PMV BLD AUTO: 10.6 FL (ref 8.9–12.7)
PMV BLD AUTO: 10.9 FL (ref 8.9–12.7)
POTASSIUM SERPL-SCNC: 3.6 MMOL/L (ref 3.5–5.3)
PROT SERPL-MCNC: 8.1 G/DL (ref 6.4–8.4)
PROTHROMBIN TIME: 15.5 SECONDS (ref 12.3–15)
PROTHROMBIN TIME: 17.1 SECONDS (ref 12.3–15)
RBC # BLD AUTO: 4.95 MILLION/UL (ref 3.88–5.62)
RBC # BLD AUTO: 5.77 MILLION/UL (ref 3.88–5.62)
SODIUM SERPL-SCNC: 138 MMOL/L (ref 135–147)
TSH SERPL DL<=0.05 MIU/L-ACNC: 1.22 UIU/ML (ref 0.45–4.5)
WBC # BLD AUTO: 7.2 THOUSAND/UL (ref 4.31–10.16)
WBC # BLD AUTO: 7.61 THOUSAND/UL (ref 4.31–10.16)

## 2025-07-23 PROCEDURE — 85730 THROMBOPLASTIN TIME PARTIAL: CPT

## 2025-07-23 PROCEDURE — 99285 EMERGENCY DEPT VISIT HI MDM: CPT

## 2025-07-23 PROCEDURE — 99223 1ST HOSP IP/OBS HIGH 75: CPT | Performed by: STUDENT IN AN ORGANIZED HEALTH CARE EDUCATION/TRAINING PROGRAM

## 2025-07-23 PROCEDURE — 84484 ASSAY OF TROPONIN QUANT: CPT

## 2025-07-23 PROCEDURE — 96361 HYDRATE IV INFUSION ADD-ON: CPT

## 2025-07-23 PROCEDURE — 93005 ELECTROCARDIOGRAM TRACING: CPT

## 2025-07-23 PROCEDURE — 83735 ASSAY OF MAGNESIUM: CPT

## 2025-07-23 PROCEDURE — 71045 X-RAY EXAM CHEST 1 VIEW: CPT

## 2025-07-23 PROCEDURE — 85610 PROTHROMBIN TIME: CPT

## 2025-07-23 PROCEDURE — 85027 COMPLETE CBC AUTOMATED: CPT

## 2025-07-23 PROCEDURE — 96374 THER/PROPH/DIAG INJ IV PUSH: CPT

## 2025-07-23 PROCEDURE — 36415 COLL VENOUS BLD VENIPUNCTURE: CPT

## 2025-07-23 PROCEDURE — 99291 CRITICAL CARE FIRST HOUR: CPT

## 2025-07-23 PROCEDURE — 80053 COMPREHEN METABOLIC PANEL: CPT

## 2025-07-23 PROCEDURE — 85025 COMPLETE CBC W/AUTO DIFF WBC: CPT

## 2025-07-23 PROCEDURE — 84443 ASSAY THYROID STIM HORMONE: CPT

## 2025-07-23 PROCEDURE — 83880 ASSAY OF NATRIURETIC PEPTIDE: CPT

## 2025-07-23 RX ORDER — HEPARIN SODIUM 10000 [USP'U]/100ML
3-20 INJECTION, SOLUTION INTRAVENOUS
Status: DISPENSED | OUTPATIENT
Start: 2025-07-23 | End: 2025-07-25

## 2025-07-23 RX ORDER — NYSTATIN 100000 U/G
CREAM TOPICAL DAILY PRN
Status: DISCONTINUED | OUTPATIENT
Start: 2025-07-24 | End: 2025-07-27 | Stop reason: HOSPADM

## 2025-07-23 RX ORDER — HEPARIN SODIUM 1000 [USP'U]/ML
3600 INJECTION, SOLUTION INTRAVENOUS; SUBCUTANEOUS ONCE
Status: COMPLETED | OUTPATIENT
Start: 2025-07-23 | End: 2025-07-23

## 2025-07-23 RX ORDER — NITROGLYCERIN 0.4 MG/1
0.4 TABLET SUBLINGUAL
Status: DISCONTINUED | OUTPATIENT
Start: 2025-07-23 | End: 2025-07-27 | Stop reason: HOSPADM

## 2025-07-23 RX ORDER — CLOPIDOGREL BISULFATE 75 MG/1
75 TABLET ORAL DAILY
Status: DISCONTINUED | OUTPATIENT
Start: 2025-07-24 | End: 2025-07-27 | Stop reason: HOSPADM

## 2025-07-23 RX ORDER — MAGNESIUM SULFATE HEPTAHYDRATE 40 MG/ML
2 INJECTION, SOLUTION INTRAVENOUS ONCE
Status: COMPLETED | OUTPATIENT
Start: 2025-07-23 | End: 2025-07-23

## 2025-07-23 RX ORDER — METOPROLOL TARTRATE 25 MG/1
25 TABLET, FILM COATED ORAL EVERY 12 HOURS SCHEDULED
Status: DISCONTINUED | OUTPATIENT
Start: 2025-07-23 | End: 2025-07-24

## 2025-07-23 RX ORDER — IPRATROPIUM BROMIDE 42 UG/1
2 SPRAY, METERED NASAL 4 TIMES DAILY
Status: DISCONTINUED | OUTPATIENT
Start: 2025-07-23 | End: 2025-07-23

## 2025-07-23 RX ORDER — MULTIVIT-MIN/IRON FUM/FOLIC AC 7.5 MG-4
1 TABLET ORAL DAILY
Status: DISCONTINUED | OUTPATIENT
Start: 2025-07-24 | End: 2025-07-23

## 2025-07-23 RX ORDER — HEPARIN SODIUM 1000 [USP'U]/ML
3600 INJECTION, SOLUTION INTRAVENOUS; SUBCUTANEOUS EVERY 6 HOURS PRN
Status: DISCONTINUED | OUTPATIENT
Start: 2025-07-23 | End: 2025-07-27 | Stop reason: HOSPADM

## 2025-07-23 RX ORDER — FUROSEMIDE 20 MG/1
20 TABLET ORAL DAILY PRN
Status: DISCONTINUED | OUTPATIENT
Start: 2025-07-23 | End: 2025-07-25

## 2025-07-23 RX ORDER — FUROSEMIDE 10 MG/ML
40 INJECTION INTRAMUSCULAR; INTRAVENOUS ONCE
Status: DISCONTINUED | OUTPATIENT
Start: 2025-07-23 | End: 2025-07-23

## 2025-07-23 RX ORDER — MYCOPHENOLATE MOFETIL 250 MG/1
500 CAPSULE ORAL EVERY 12 HOURS SCHEDULED
Status: DISCONTINUED | OUTPATIENT
Start: 2025-07-23 | End: 2025-07-27 | Stop reason: HOSPADM

## 2025-07-23 RX ORDER — FUROSEMIDE 10 MG/ML
40 INJECTION INTRAMUSCULAR; INTRAVENOUS ONCE
Status: DISCONTINUED | OUTPATIENT
Start: 2025-07-23 | End: 2025-07-25

## 2025-07-23 RX ORDER — SODIUM CHLORIDE, SODIUM LACTATE, POTASSIUM CHLORIDE, CALCIUM CHLORIDE 600; 310; 30; 20 MG/100ML; MG/100ML; MG/100ML; MG/100ML
50 INJECTION, SOLUTION INTRAVENOUS CONTINUOUS
Status: DISCONTINUED | OUTPATIENT
Start: 2025-07-23 | End: 2025-07-23

## 2025-07-23 RX ORDER — TRAMADOL HYDROCHLORIDE 50 MG/1
50 TABLET ORAL EVERY 6 HOURS PRN
Status: DISCONTINUED | OUTPATIENT
Start: 2025-07-23 | End: 2025-07-27 | Stop reason: HOSPADM

## 2025-07-23 RX ORDER — METOPROLOL TARTRATE 1 MG/ML
5 INJECTION, SOLUTION INTRAVENOUS ONCE
Status: COMPLETED | OUTPATIENT
Start: 2025-07-23 | End: 2025-07-23

## 2025-07-23 RX ORDER — HEPARIN SODIUM 1000 [USP'U]/ML
1800 INJECTION, SOLUTION INTRAVENOUS; SUBCUTANEOUS EVERY 6 HOURS PRN
Status: DISCONTINUED | OUTPATIENT
Start: 2025-07-23 | End: 2025-07-27 | Stop reason: HOSPADM

## 2025-07-23 RX ORDER — MYCOPHENOLATE MOFETIL 500 MG/1
500 TABLET ORAL 2 TIMES DAILY
Status: DISCONTINUED | OUTPATIENT
Start: 2025-07-23 | End: 2025-07-23

## 2025-07-23 RX ORDER — FUROSEMIDE 20 MG/1
20 TABLET ORAL DAILY
Status: DISCONTINUED | OUTPATIENT
Start: 2025-07-24 | End: 2025-07-23

## 2025-07-23 RX ORDER — PREDNISONE 5 MG/1
5 TABLET ORAL DAILY
Status: DISCONTINUED | OUTPATIENT
Start: 2025-07-24 | End: 2025-07-27 | Stop reason: HOSPADM

## 2025-07-23 RX ORDER — ATENOLOL 25 MG/1
50 TABLET ORAL DAILY
Status: CANCELLED | OUTPATIENT
Start: 2025-07-24

## 2025-07-23 RX ORDER — ASPIRIN 81 MG/1
81 TABLET, CHEWABLE ORAL DAILY
Status: DISCONTINUED | OUTPATIENT
Start: 2025-07-24 | End: 2025-07-25

## 2025-07-23 RX ORDER — MUPIROCIN 2 %
OINTMENT (GRAM) TOPICAL DAILY PRN
Status: DISCONTINUED | OUTPATIENT
Start: 2025-07-23 | End: 2025-07-27 | Stop reason: HOSPADM

## 2025-07-23 RX ORDER — SACUBITRIL AND VALSARTAN 24; 26 MG/1; MG/1
1 TABLET, FILM COATED ORAL 2 TIMES DAILY
Status: DISPENSED | OUTPATIENT
Start: 2025-07-23 | End: 2025-07-26

## 2025-07-23 RX ADMIN — MAGNESIUM SULFATE HEPTAHYDRATE 2 G: 40 INJECTION, SOLUTION INTRAVENOUS at 18:45

## 2025-07-23 RX ADMIN — METOPROLOL TARTRATE 5 MG: 1 INJECTION, SOLUTION INTRAVENOUS at 16:32

## 2025-07-23 RX ADMIN — SACUBITRIL AND VALSARTAN 1 TABLET: 24; 26 TABLET, FILM COATED ORAL at 22:10

## 2025-07-23 RX ADMIN — MYCOPHENOLATE MOFETIL 500 MG: 250 CAPSULE ORAL at 22:09

## 2025-07-23 RX ADMIN — HEPARIN SODIUM 3600 UNITS: 1000 INJECTION, SOLUTION INTRAVENOUS; SUBCUTANEOUS at 20:40

## 2025-07-23 RX ADMIN — HEPARIN SODIUM 12 UNITS/KG/HR: 10000 INJECTION, SOLUTION INTRAVENOUS at 20:41

## 2025-07-23 RX ADMIN — NITROGLYCERIN 0.5 INCH: 20 OINTMENT TOPICAL at 18:38

## 2025-07-23 RX ADMIN — METOPROLOL TARTRATE 25 MG: 25 TABLET, FILM COATED ORAL at 22:07

## 2025-07-23 RX ADMIN — SODIUM CHLORIDE, SODIUM LACTATE, POTASSIUM CHLORIDE, AND CALCIUM CHLORIDE 50 ML/HR: .6; .31; .03; .02 INJECTION, SOLUTION INTRAVENOUS at 16:25

## 2025-07-23 NOTE — TELEPHONE ENCOUNTER
LM for pt offering sooner appt w/Dr. Reyes today at 1:30 or tomorrow at 1:30 in [facility] if still available

## 2025-07-24 PROBLEM — I21.4 NSTEMI (NON-ST ELEVATED MYOCARDIAL INFARCTION) (HCC): Status: ACTIVE | Noted: 2025-07-24

## 2025-07-24 LAB
2HR DELTA HS TROPONIN: -1572 NG/L
ANION GAP SERPL CALCULATED.3IONS-SCNC: 7 MMOL/L (ref 4–13)
APTT PPP: 47 SECONDS (ref 23–34)
APTT PPP: 68 SECONDS (ref 23–34)
ATRIAL RATE: 131 BPM
BASOPHILS # BLD AUTO: 0.04 THOUSANDS/ÂΜL (ref 0–0.1)
BASOPHILS NFR BLD AUTO: 1 % (ref 0–1)
BUN SERPL-MCNC: 21 MG/DL (ref 5–25)
CALCIUM SERPL-MCNC: 9.1 MG/DL (ref 8.4–10.2)
CARDIAC TROPONIN I PNL SERPL HS: 9050 NG/L (ref ?–50)
CARDIAC TROPONIN I PNL SERPL HS: ABNORMAL NG/L (ref ?–50)
CHLORIDE SERPL-SCNC: 105 MMOL/L (ref 96–108)
CHOLEST SERPL-MCNC: 138 MG/DL (ref ?–200)
CO2 SERPL-SCNC: 28 MMOL/L (ref 21–32)
CREAT SERPL-MCNC: 0.64 MG/DL (ref 0.6–1.3)
EOSINOPHIL # BLD AUTO: 0.16 THOUSAND/ÂΜL (ref 0–0.61)
EOSINOPHIL NFR BLD AUTO: 2 % (ref 0–6)
ERYTHROCYTE [DISTWIDTH] IN BLOOD BY AUTOMATED COUNT: 14.5 % (ref 11.6–15.1)
GFR SERPL CREATININE-BSD FRML MDRD: 97 ML/MIN/1.73SQ M
GLUCOSE SERPL-MCNC: 128 MG/DL (ref 65–140)
GLUCOSE SERPL-MCNC: 130 MG/DL (ref 65–140)
GLUCOSE SERPL-MCNC: 142 MG/DL (ref 65–140)
GLUCOSE SERPL-MCNC: 158 MG/DL (ref 65–140)
HCT VFR BLD AUTO: 44.8 % (ref 36.5–49.3)
HDLC SERPL-MCNC: 50 MG/DL
HGB BLD-MCNC: 14.8 G/DL (ref 12–17)
IMM GRANULOCYTES # BLD AUTO: 0.02 THOUSAND/UL (ref 0–0.2)
IMM GRANULOCYTES NFR BLD AUTO: 0 % (ref 0–2)
LDLC SERPL CALC-MCNC: 73 MG/DL (ref 0–100)
LYMPHOCYTES # BLD AUTO: 1.39 THOUSANDS/ÂΜL (ref 0.6–4.47)
LYMPHOCYTES NFR BLD AUTO: 20 % (ref 14–44)
MAGNESIUM SERPL-MCNC: 2 MG/DL (ref 1.9–2.7)
MCH RBC QN AUTO: 30.7 PG (ref 26.8–34.3)
MCHC RBC AUTO-ENTMCNC: 33 G/DL (ref 31.4–37.4)
MCV RBC AUTO: 93 FL (ref 82–98)
MONOCYTES # BLD AUTO: 0.83 THOUSAND/ÂΜL (ref 0.17–1.22)
MONOCYTES NFR BLD AUTO: 12 % (ref 4–12)
NEUTROPHILS # BLD AUTO: 4.37 THOUSANDS/ÂΜL (ref 1.85–7.62)
NEUTS SEG NFR BLD AUTO: 65 % (ref 43–75)
NRBC BLD AUTO-RTO: 0 /100 WBCS
PLATELET # BLD AUTO: 127 THOUSANDS/UL (ref 149–390)
PMV BLD AUTO: 11.4 FL (ref 8.9–12.7)
POTASSIUM SERPL-SCNC: 3.4 MMOL/L (ref 3.5–5.3)
QRS AXIS: 133 DEGREES
QRSD INTERVAL: 156 MS
QT INTERVAL: 308 MS
QTC INTERVAL: 431 MS
RBC # BLD AUTO: 4.82 MILLION/UL (ref 3.88–5.62)
SODIUM SERPL-SCNC: 140 MMOL/L (ref 135–147)
T WAVE AXIS: -70 DEGREES
TRIGL SERPL-MCNC: 75 MG/DL (ref ?–150)
VENTRICULAR RATE: 118 BPM
WBC # BLD AUTO: 6.81 THOUSAND/UL (ref 4.31–10.16)

## 2025-07-24 PROCEDURE — 80048 BASIC METABOLIC PNL TOTAL CA: CPT

## 2025-07-24 PROCEDURE — C1769 GUIDE WIRE: HCPCS | Performed by: INTERNAL MEDICINE

## 2025-07-24 PROCEDURE — 93005 ELECTROCARDIOGRAM TRACING: CPT

## 2025-07-24 PROCEDURE — C1760 CLOSURE DEV, VASC: HCPCS | Performed by: INTERNAL MEDICINE

## 2025-07-24 PROCEDURE — 4A023N7 MEASUREMENT OF CARDIAC SAMPLING AND PRESSURE, LEFT HEART, PERCUTANEOUS APPROACH: ICD-10-PCS | Performed by: INTERNAL MEDICINE

## 2025-07-24 PROCEDURE — 82948 REAGENT STRIP/BLOOD GLUCOSE: CPT

## 2025-07-24 PROCEDURE — 85730 THROMBOPLASTIN TIME PARTIAL: CPT | Performed by: HOSPITALIST

## 2025-07-24 PROCEDURE — 93458 L HRT ARTERY/VENTRICLE ANGIO: CPT | Performed by: INTERNAL MEDICINE

## 2025-07-24 PROCEDURE — C1887 CATHETER, GUIDING: HCPCS | Performed by: INTERNAL MEDICINE

## 2025-07-24 PROCEDURE — C1894 INTRO/SHEATH, NON-LASER: HCPCS | Performed by: INTERNAL MEDICINE

## 2025-07-24 PROCEDURE — 99152 MOD SED SAME PHYS/QHP 5/>YRS: CPT | Performed by: INTERNAL MEDICINE

## 2025-07-24 PROCEDURE — 85730 THROMBOPLASTIN TIME PARTIAL: CPT | Performed by: INTERNAL MEDICINE

## 2025-07-24 PROCEDURE — 80061 LIPID PANEL: CPT

## 2025-07-24 PROCEDURE — 99232 SBSQ HOSP IP/OBS MODERATE 35: CPT | Performed by: HOSPITALIST

## 2025-07-24 PROCEDURE — 93459 L HRT ART/GRFT ANGIO: CPT | Performed by: INTERNAL MEDICINE

## 2025-07-24 PROCEDURE — B2181ZZ FLUOROSCOPY OF LEFT INTERNAL MAMMARY BYPASS GRAFT USING LOW OSMOLAR CONTRAST: ICD-10-PCS | Performed by: INTERNAL MEDICINE

## 2025-07-24 PROCEDURE — 99153 MOD SED SAME PHYS/QHP EA: CPT | Performed by: INTERNAL MEDICINE

## 2025-07-24 PROCEDURE — 93010 ELECTROCARDIOGRAM REPORT: CPT | Performed by: INTERNAL MEDICINE

## 2025-07-24 PROCEDURE — B2111ZZ FLUOROSCOPY OF MULTIPLE CORONARY ARTERIES USING LOW OSMOLAR CONTRAST: ICD-10-PCS | Performed by: INTERNAL MEDICINE

## 2025-07-24 PROCEDURE — 84484 ASSAY OF TROPONIN QUANT: CPT

## 2025-07-24 PROCEDURE — 83735 ASSAY OF MAGNESIUM: CPT

## 2025-07-24 PROCEDURE — 99223 1ST HOSP IP/OBS HIGH 75: CPT | Performed by: INTERNAL MEDICINE

## 2025-07-24 PROCEDURE — 85025 COMPLETE CBC W/AUTO DIFF WBC: CPT

## 2025-07-24 DEVICE — CLOSURE DEVICE ANGIO-SEAL VIP 6FR: Type: IMPLANTABLE DEVICE | Status: FUNCTIONAL

## 2025-07-24 RX ORDER — METOPROLOL TARTRATE 25 MG/1
25 TABLET, FILM COATED ORAL EVERY 6 HOURS
Status: DISCONTINUED | OUTPATIENT
Start: 2025-07-24 | End: 2025-07-25

## 2025-07-24 RX ORDER — MIDAZOLAM HYDROCHLORIDE 2 MG/2ML
INJECTION, SOLUTION INTRAMUSCULAR; INTRAVENOUS CODE/TRAUMA/SEDATION MEDICATION
Status: DISCONTINUED | OUTPATIENT
Start: 2025-07-24 | End: 2025-07-24 | Stop reason: HOSPADM

## 2025-07-24 RX ORDER — FENTANYL CITRATE 50 UG/ML
INJECTION, SOLUTION INTRAMUSCULAR; INTRAVENOUS CODE/TRAUMA/SEDATION MEDICATION
Status: DISCONTINUED | OUTPATIENT
Start: 2025-07-24 | End: 2025-07-24 | Stop reason: HOSPADM

## 2025-07-24 RX ORDER — POTASSIUM CHLORIDE 14.9 MG/ML
20 INJECTION INTRAVENOUS ONCE
Status: COMPLETED | OUTPATIENT
Start: 2025-07-24 | End: 2025-07-24

## 2025-07-24 RX ORDER — POTASSIUM CHLORIDE 1500 MG/1
40 TABLET, EXTENDED RELEASE ORAL ONCE
Status: COMPLETED | OUTPATIENT
Start: 2025-07-24 | End: 2025-07-24

## 2025-07-24 RX ORDER — LIDOCAINE HYDROCHLORIDE 10 MG/ML
INJECTION, SOLUTION EPIDURAL; INFILTRATION; INTRACAUDAL; PERINEURAL CODE/TRAUMA/SEDATION MEDICATION
Status: DISCONTINUED | OUTPATIENT
Start: 2025-07-24 | End: 2025-07-24 | Stop reason: HOSPADM

## 2025-07-24 RX ORDER — DIGOXIN 0.25 MG/ML
250 INJECTION INTRAMUSCULAR; INTRAVENOUS EVERY 6 HOURS
Status: DISCONTINUED | OUTPATIENT
Start: 2025-07-24 | End: 2025-07-25

## 2025-07-24 RX ORDER — METOPROLOL TARTRATE 1 MG/ML
5 INJECTION, SOLUTION INTRAVENOUS ONCE
Status: COMPLETED | OUTPATIENT
Start: 2025-07-24 | End: 2025-07-24

## 2025-07-24 RX ORDER — INSULIN LISPRO 100 [IU]/ML
1-5 INJECTION, SOLUTION INTRAVENOUS; SUBCUTANEOUS EVERY 6 HOURS
Status: DISCONTINUED | OUTPATIENT
Start: 2025-07-24 | End: 2025-07-25

## 2025-07-24 RX ORDER — SODIUM CHLORIDE 9 MG/ML
50 INJECTION, SOLUTION INTRAVENOUS CONTINUOUS
Status: DISPENSED | OUTPATIENT
Start: 2025-07-24 | End: 2025-07-24

## 2025-07-24 RX ORDER — CLOPIDOGREL BISULFATE 75 MG/1
TABLET ORAL CODE/TRAUMA/SEDATION MEDICATION
Status: DISCONTINUED | OUTPATIENT
Start: 2025-07-24 | End: 2025-07-24 | Stop reason: HOSPADM

## 2025-07-24 RX ORDER — INSULIN LISPRO 100 [IU]/ML
1-5 INJECTION, SOLUTION INTRAVENOUS; SUBCUTANEOUS EVERY 6 HOURS
Status: DISCONTINUED | OUTPATIENT
Start: 2025-07-24 | End: 2025-07-24

## 2025-07-24 RX ADMIN — POTASSIUM CHLORIDE 40 MEQ: 1500 TABLET, EXTENDED RELEASE ORAL at 13:52

## 2025-07-24 RX ADMIN — EMPAGLIFLOZIN 10 MG: 10 TABLET, FILM COATED ORAL at 10:03

## 2025-07-24 RX ADMIN — POTASSIUM CHLORIDE 20 MEQ: 14.9 INJECTION, SOLUTION INTRAVENOUS at 10:58

## 2025-07-24 RX ADMIN — METOROPROLOL TARTRATE 5 MG: 5 INJECTION, SOLUTION INTRAVENOUS at 11:03

## 2025-07-24 RX ADMIN — METOPROLOL TARTRATE 25 MG: 25 TABLET, FILM COATED ORAL at 10:03

## 2025-07-24 RX ADMIN — METOROPROLOL TARTRATE 5 MG: 5 INJECTION, SOLUTION INTRAVENOUS at 04:55

## 2025-07-24 RX ADMIN — PREDNISONE 5 MG: 5 TABLET ORAL at 10:03

## 2025-07-24 RX ADMIN — MULTIPLE VITAMINS W/ MINERALS TAB 1 TABLET: TAB ORAL at 10:03

## 2025-07-24 RX ADMIN — METOROPROLOL TARTRATE 5 MG: 5 INJECTION, SOLUTION INTRAVENOUS at 02:37

## 2025-07-24 RX ADMIN — ASPIRIN 81 MG: 81 TABLET, CHEWABLE ORAL at 10:03

## 2025-07-24 RX ADMIN — NITROGLYCERIN 0.4 MG: 0.4 TABLET SUBLINGUAL at 00:20

## 2025-07-24 RX ADMIN — SACUBITRIL AND VALSARTAN 1 TABLET: 24; 26 TABLET, FILM COATED ORAL at 10:04

## 2025-07-24 RX ADMIN — MYCOPHENOLATE MOFETIL 500 MG: 250 CAPSULE ORAL at 10:03

## 2025-07-24 RX ADMIN — HEPARIN SODIUM 1800 UNITS: 1000 INJECTION, SOLUTION INTRAVENOUS; SUBCUTANEOUS at 06:40

## 2025-07-24 NOTE — TELEPHONE ENCOUNTER
I see this patient is scheduled as an outpatient for [facility] next Tuesday, however, he is an inpatient currently and getting a cath today at [facility] so he can be removed from the outpatient schedule!

## 2025-07-25 ENCOUNTER — APPOINTMENT (INPATIENT)
Dept: NON INVASIVE DIAGNOSTICS | Facility: HOSPITAL | Age: 70
DRG: 286 | End: 2025-07-25
Payer: COMMERCIAL

## 2025-07-25 LAB
ANION GAP SERPL CALCULATED.3IONS-SCNC: 4 MMOL/L (ref 4–13)
AORTIC ROOT: 3.2 CM
APTT PPP: 51 SECONDS (ref 23–34)
APTT PPP: 65 SECONDS (ref 23–34)
ATRIAL RATE: 117 BPM
ATRIAL RATE: 131 BPM
BASOPHILS # BLD AUTO: 0.04 THOUSANDS/ÂΜL (ref 0–0.1)
BASOPHILS NFR BLD AUTO: 1 % (ref 0–1)
BSA FOR ECHO PROCEDURE: 1.72 M2
BUN SERPL-MCNC: 20 MG/DL (ref 5–25)
CALCIUM SERPL-MCNC: 8.5 MG/DL (ref 8.4–10.2)
CHLORIDE SERPL-SCNC: 108 MMOL/L (ref 96–108)
CO2 SERPL-SCNC: 26 MMOL/L (ref 21–32)
CREAT SERPL-MCNC: 0.63 MG/DL (ref 0.6–1.3)
EOSINOPHIL # BLD AUTO: 0.19 THOUSAND/ÂΜL (ref 0–0.61)
EOSINOPHIL NFR BLD AUTO: 3 % (ref 0–6)
ERYTHROCYTE [DISTWIDTH] IN BLOOD BY AUTOMATED COUNT: 14.8 % (ref 11.6–15.1)
FRACTIONAL SHORTENING: 8 (ref 28–44)
GFR SERPL CREATININE-BSD FRML MDRD: 98 ML/MIN/1.73SQ M
GLUCOSE SERPL-MCNC: 115 MG/DL (ref 65–140)
GLUCOSE SERPL-MCNC: 139 MG/DL (ref 65–140)
GLUCOSE SERPL-MCNC: 140 MG/DL (ref 65–140)
GLUCOSE SERPL-MCNC: 145 MG/DL (ref 65–140)
GLUCOSE SERPL-MCNC: 174 MG/DL (ref 65–140)
GLUCOSE SERPL-MCNC: 188 MG/DL (ref 65–140)
GLUCOSE SERPL-MCNC: 255 MG/DL (ref 65–140)
HCT VFR BLD AUTO: 45.5 % (ref 36.5–49.3)
HGB BLD-MCNC: 14.9 G/DL (ref 12–17)
IMM GRANULOCYTES # BLD AUTO: 0.02 THOUSAND/UL (ref 0–0.2)
IMM GRANULOCYTES NFR BLD AUTO: 0 % (ref 0–2)
INTERVENTRICULAR SEPTUM IN DIASTOLE (PARASTERNAL SHORT AXIS VIEW): 1 CM
INTERVENTRICULAR SEPTUM: 1 CM (ref 0.6–1.1)
LEFT ATRIUM SIZE: 4 CM
LEFT INTERNAL DIMENSION IN SYSTOLE: 4.8 CM (ref 2.1–4)
LEFT VENTRICULAR INTERNAL DIMENSION IN DIASTOLE: 5.2 CM (ref 3.5–6)
LEFT VENTRICULAR POSTERIOR WALL IN END DIASTOLE: 1.2 CM
LEFT VENTRICULAR STROKE VOLUME: 17 ML
LV EF US.2D.A4C+ESTIMATED: 20 %
LVSV (TEICH): 17 ML
LYMPHOCYTES # BLD AUTO: 1.43 THOUSANDS/ÂΜL (ref 0.6–4.47)
LYMPHOCYTES NFR BLD AUTO: 22 % (ref 14–44)
MAGNESIUM SERPL-MCNC: 2 MG/DL (ref 1.9–2.7)
MCH RBC QN AUTO: 30.5 PG (ref 26.8–34.3)
MCHC RBC AUTO-ENTMCNC: 32.7 G/DL (ref 31.4–37.4)
MCV RBC AUTO: 93 FL (ref 82–98)
MONOCYTES # BLD AUTO: 0.71 THOUSAND/ÂΜL (ref 0.17–1.22)
MONOCYTES NFR BLD AUTO: 11 % (ref 4–12)
NEUTROPHILS # BLD AUTO: 4.03 THOUSANDS/ÂΜL (ref 1.85–7.62)
NEUTS SEG NFR BLD AUTO: 63 % (ref 43–75)
NRBC BLD AUTO-RTO: 0 /100 WBCS
PLATELET # BLD AUTO: 117 THOUSANDS/UL (ref 149–390)
PMV BLD AUTO: 10.8 FL (ref 8.9–12.7)
POTASSIUM SERPL-SCNC: 4.3 MMOL/L (ref 3.5–5.3)
PR INTERVAL: 152 MS
PR INTERVAL: 152 MS
PR INTERVAL: 96 MS
QRS AXIS: 131 DEGREES
QRS AXIS: 136 DEGREES
QRS AXIS: 140 DEGREES
QRS AXIS: 150 DEGREES
QRSD INTERVAL: 156 MS
QRSD INTERVAL: 156 MS
QRSD INTERVAL: 158 MS
QRSD INTERVAL: 158 MS
QT INTERVAL: 370 MS
QT INTERVAL: 384 MS
QT INTERVAL: 410 MS
QT INTERVAL: 422 MS
QTC INTERVAL: 536 MS
QTC INTERVAL: 537 MS
QTC INTERVAL: 573 MS
QTC INTERVAL: 589 MS
RBC # BLD AUTO: 4.88 MILLION/UL (ref 3.88–5.62)
SL CV LV EF: 25
SL CV PED ECHO LEFT VENTRICLE DIASTOLIC VOLUME (MOD BIPLANE) 2D: 127 ML
SL CV PED ECHO LEFT VENTRICLE SYSTOLIC VOLUME (MOD BIPLANE) 2D: 110 ML
SODIUM SERPL-SCNC: 138 MMOL/L (ref 135–147)
T WAVE AXIS: -35 DEGREES
T WAVE AXIS: -45 DEGREES
T WAVE AXIS: -51 DEGREES
T WAVE AXIS: -61 DEGREES
VENTRICULAR RATE: 117 BPM
VENTRICULAR RATE: 127 BPM
WBC # BLD AUTO: 6.42 THOUSAND/UL (ref 4.31–10.16)

## 2025-07-25 PROCEDURE — 83735 ASSAY OF MAGNESIUM: CPT | Performed by: HOSPITALIST

## 2025-07-25 PROCEDURE — 99232 SBSQ HOSP IP/OBS MODERATE 35: CPT | Performed by: HOSPITALIST

## 2025-07-25 PROCEDURE — 80048 BASIC METABOLIC PNL TOTAL CA: CPT | Performed by: HOSPITALIST

## 2025-07-25 PROCEDURE — 85730 THROMBOPLASTIN TIME PARTIAL: CPT

## 2025-07-25 PROCEDURE — 93308 TTE F-UP OR LMTD: CPT | Performed by: INTERNAL MEDICINE

## 2025-07-25 PROCEDURE — 93010 ELECTROCARDIOGRAM REPORT: CPT | Performed by: STUDENT IN AN ORGANIZED HEALTH CARE EDUCATION/TRAINING PROGRAM

## 2025-07-25 PROCEDURE — 85025 COMPLETE CBC W/AUTO DIFF WBC: CPT | Performed by: HOSPITALIST

## 2025-07-25 PROCEDURE — 82948 REAGENT STRIP/BLOOD GLUCOSE: CPT

## 2025-07-25 PROCEDURE — 99232 SBSQ HOSP IP/OBS MODERATE 35: CPT | Performed by: STUDENT IN AN ORGANIZED HEALTH CARE EDUCATION/TRAINING PROGRAM

## 2025-07-25 PROCEDURE — 93308 TTE F-UP OR LMTD: CPT

## 2025-07-25 PROCEDURE — 85730 THROMBOPLASTIN TIME PARTIAL: CPT | Performed by: HOSPITALIST

## 2025-07-25 RX ORDER — METOPROLOL SUCCINATE 50 MG/1
50 TABLET, EXTENDED RELEASE ORAL EVERY 12 HOURS
Status: DISCONTINUED | OUTPATIENT
Start: 2025-07-25 | End: 2025-07-27 | Stop reason: HOSPADM

## 2025-07-25 RX ORDER — INSULIN LISPRO 100 [IU]/ML
1-5 INJECTION, SOLUTION INTRAVENOUS; SUBCUTANEOUS
Status: DISCONTINUED | OUTPATIENT
Start: 2025-07-25 | End: 2025-07-27 | Stop reason: HOSPADM

## 2025-07-25 RX ORDER — METOPROLOL SUCCINATE 25 MG/1
25 TABLET, EXTENDED RELEASE ORAL DAILY
Status: DISCONTINUED | OUTPATIENT
Start: 2025-07-25 | End: 2025-07-25

## 2025-07-25 RX ORDER — METOPROLOL SUCCINATE 25 MG/1
25 TABLET, EXTENDED RELEASE ORAL ONCE
Status: COMPLETED | OUTPATIENT
Start: 2025-07-25 | End: 2025-07-25

## 2025-07-25 RX ADMIN — APIXABAN 5 MG: 5 TABLET, FILM COATED ORAL at 23:48

## 2025-07-25 RX ADMIN — PERFLUTREN 0.4 ML/MIN: 6.52 INJECTION, SUSPENSION INTRAVENOUS at 13:45

## 2025-07-25 RX ADMIN — PREDNISONE 5 MG: 5 TABLET ORAL at 08:07

## 2025-07-25 RX ADMIN — METOPROLOL SUCCINATE 50 MG: 50 TABLET, EXTENDED RELEASE ORAL at 23:48

## 2025-07-25 RX ADMIN — DIGOXIN 250 MCG: 250 INJECTION, SOLUTION INTRAMUSCULAR; INTRAVENOUS; PARENTERAL at 11:56

## 2025-07-25 RX ADMIN — HEPARIN SODIUM 1800 UNITS: 1000 INJECTION, SOLUTION INTRAVENOUS; SUBCUTANEOUS at 09:39

## 2025-07-25 RX ADMIN — INSULIN LISPRO 2 UNITS: 100 INJECTION, SOLUTION INTRAVENOUS; SUBCUTANEOUS at 11:57

## 2025-07-25 RX ADMIN — HEPARIN SODIUM 16 UNITS/KG/HR: 10000 INJECTION, SOLUTION INTRAVENOUS at 09:39

## 2025-07-25 RX ADMIN — MYCOPHENOLATE MOFETIL 500 MG: 250 CAPSULE ORAL at 23:48

## 2025-07-25 RX ADMIN — METOPROLOL SUCCINATE 25 MG: 25 TABLET, EXTENDED RELEASE ORAL at 11:56

## 2025-07-25 RX ADMIN — CLOPIDOGREL 75 MG: 75 TABLET ORAL at 08:07

## 2025-07-25 RX ADMIN — ASPIRIN 81 MG: 81 TABLET, CHEWABLE ORAL at 08:07

## 2025-07-25 RX ADMIN — MULTIPLE VITAMINS W/ MINERALS TAB 1 TABLET: TAB ORAL at 08:07

## 2025-07-25 RX ADMIN — DIGOXIN 250 MCG: 250 INJECTION, SOLUTION INTRAMUSCULAR; INTRAVENOUS; PARENTERAL at 00:54

## 2025-07-25 RX ADMIN — MYCOPHENOLATE MOFETIL 500 MG: 250 CAPSULE ORAL at 08:07

## 2025-07-25 RX ADMIN — DIGOXIN 250 MCG: 250 INJECTION, SOLUTION INTRAMUSCULAR; INTRAVENOUS; PARENTERAL at 05:07

## 2025-07-25 RX ADMIN — METOPROLOL TARTRATE 25 MG: 25 TABLET, FILM COATED ORAL at 08:09

## 2025-07-26 LAB
ANION GAP SERPL CALCULATED.3IONS-SCNC: 6 MMOL/L (ref 4–13)
BUN SERPL-MCNC: 17 MG/DL (ref 5–25)
CALCIUM SERPL-MCNC: 9 MG/DL (ref 8.4–10.2)
CHLORIDE SERPL-SCNC: 104 MMOL/L (ref 96–108)
CO2 SERPL-SCNC: 25 MMOL/L (ref 21–32)
CREAT SERPL-MCNC: 0.57 MG/DL (ref 0.6–1.3)
ERYTHROCYTE [DISTWIDTH] IN BLOOD BY AUTOMATED COUNT: 14.5 % (ref 11.6–15.1)
GFR SERPL CREATININE-BSD FRML MDRD: 102 ML/MIN/1.73SQ M
GLUCOSE SERPL-MCNC: 161 MG/DL (ref 65–140)
GLUCOSE SERPL-MCNC: 186 MG/DL (ref 65–140)
GLUCOSE SERPL-MCNC: 218 MG/DL (ref 65–140)
GLUCOSE SERPL-MCNC: 234 MG/DL (ref 65–140)
GLUCOSE SERPL-MCNC: 87 MG/DL (ref 65–140)
HCT VFR BLD AUTO: 45.6 % (ref 36.5–49.3)
HGB BLD-MCNC: 15.2 G/DL (ref 12–17)
MCH RBC QN AUTO: 31 PG (ref 26.8–34.3)
MCHC RBC AUTO-ENTMCNC: 33.3 G/DL (ref 31.4–37.4)
MCV RBC AUTO: 93 FL (ref 82–98)
PLATELET # BLD AUTO: 98 THOUSANDS/UL (ref 149–390)
PMV BLD AUTO: 10.9 FL (ref 8.9–12.7)
POTASSIUM SERPL-SCNC: 4.2 MMOL/L (ref 3.5–5.3)
RBC # BLD AUTO: 4.91 MILLION/UL (ref 3.88–5.62)
SODIUM SERPL-SCNC: 135 MMOL/L (ref 135–147)
WBC # BLD AUTO: 6.15 THOUSAND/UL (ref 4.31–10.16)

## 2025-07-26 PROCEDURE — 99232 SBSQ HOSP IP/OBS MODERATE 35: CPT | Performed by: HOSPITALIST

## 2025-07-26 PROCEDURE — 85027 COMPLETE CBC AUTOMATED: CPT | Performed by: PHYSICIAN ASSISTANT

## 2025-07-26 PROCEDURE — 82948 REAGENT STRIP/BLOOD GLUCOSE: CPT

## 2025-07-26 PROCEDURE — 99232 SBSQ HOSP IP/OBS MODERATE 35: CPT | Performed by: INTERNAL MEDICINE

## 2025-07-26 PROCEDURE — 80048 BASIC METABOLIC PNL TOTAL CA: CPT | Performed by: PHYSICIAN ASSISTANT

## 2025-07-26 RX ORDER — SACUBITRIL AND VALSARTAN 24; 26 MG/1; MG/1
1 TABLET, FILM COATED ORAL 2 TIMES DAILY
Status: DISCONTINUED | OUTPATIENT
Start: 2025-07-26 | End: 2025-07-27 | Stop reason: HOSPADM

## 2025-07-26 RX ORDER — SACUBITRIL AND VALSARTAN 24; 26 MG/1; MG/1
1 TABLET, FILM COATED ORAL 2 TIMES DAILY
Status: DISCONTINUED | OUTPATIENT
Start: 2025-07-26 | End: 2025-07-26

## 2025-07-26 RX ADMIN — METOPROLOL SUCCINATE 50 MG: 50 TABLET, EXTENDED RELEASE ORAL at 22:31

## 2025-07-26 RX ADMIN — INSULIN LISPRO 2 UNITS: 100 INJECTION, SOLUTION INTRAVENOUS; SUBCUTANEOUS at 12:42

## 2025-07-26 RX ADMIN — APIXABAN 5 MG: 5 TABLET, FILM COATED ORAL at 18:33

## 2025-07-26 RX ADMIN — INSULIN LISPRO 1 UNITS: 100 INJECTION, SOLUTION INTRAVENOUS; SUBCUTANEOUS at 18:33

## 2025-07-26 RX ADMIN — SACUBITRIL AND VALSARTAN 1 TABLET: 24; 26 TABLET, FILM COATED ORAL at 18:33

## 2025-07-26 RX ADMIN — METOPROLOL SUCCINATE 50 MG: 50 TABLET, EXTENDED RELEASE ORAL at 08:22

## 2025-07-26 RX ADMIN — PREDNISONE 5 MG: 5 TABLET ORAL at 08:22

## 2025-07-26 RX ADMIN — SACUBITRIL AND VALSARTAN 1 TABLET: 24; 26 TABLET, FILM COATED ORAL at 12:42

## 2025-07-26 RX ADMIN — CLOPIDOGREL 75 MG: 75 TABLET ORAL at 08:22

## 2025-07-26 RX ADMIN — MYCOPHENOLATE MOFETIL 500 MG: 250 CAPSULE ORAL at 22:32

## 2025-07-26 RX ADMIN — MULTIPLE VITAMINS W/ MINERALS TAB 1 TABLET: TAB ORAL at 08:23

## 2025-07-26 RX ADMIN — APIXABAN 5 MG: 5 TABLET, FILM COATED ORAL at 08:22

## 2025-07-26 RX ADMIN — INSULIN LISPRO 1 UNITS: 100 INJECTION, SOLUTION INTRAVENOUS; SUBCUTANEOUS at 22:33

## 2025-07-26 RX ADMIN — MYCOPHENOLATE MOFETIL 500 MG: 250 CAPSULE ORAL at 08:22

## 2025-07-27 VITALS
RESPIRATION RATE: 15 BRPM | DIASTOLIC BLOOD PRESSURE: 73 MMHG | HEIGHT: 66 IN | SYSTOLIC BLOOD PRESSURE: 127 MMHG | BODY MASS INDEX: 22.5 KG/M2 | OXYGEN SATURATION: 96 % | TEMPERATURE: 97.5 F | WEIGHT: 140 LBS | HEART RATE: 77 BPM

## 2025-07-27 LAB
ANION GAP SERPL CALCULATED.3IONS-SCNC: 6 MMOL/L (ref 4–13)
BUN SERPL-MCNC: 14 MG/DL (ref 5–25)
CALCIUM SERPL-MCNC: 9 MG/DL (ref 8.4–10.2)
CHLORIDE SERPL-SCNC: 106 MMOL/L (ref 96–108)
CO2 SERPL-SCNC: 28 MMOL/L (ref 21–32)
CREAT SERPL-MCNC: 0.5 MG/DL (ref 0.6–1.3)
GFR SERPL CREATININE-BSD FRML MDRD: 108 ML/MIN/1.73SQ M
GLUCOSE SERPL-MCNC: 113 MG/DL (ref 65–140)
GLUCOSE SERPL-MCNC: 285 MG/DL (ref 65–140)
GLUCOSE SERPL-MCNC: 87 MG/DL (ref 65–140)
POTASSIUM SERPL-SCNC: 3.7 MMOL/L (ref 3.5–5.3)
SODIUM SERPL-SCNC: 140 MMOL/L (ref 135–147)

## 2025-07-27 PROCEDURE — 80048 BASIC METABOLIC PNL TOTAL CA: CPT

## 2025-07-27 PROCEDURE — 99239 HOSP IP/OBS DSCHRG MGMT >30: CPT | Performed by: HOSPITALIST

## 2025-07-27 PROCEDURE — 99232 SBSQ HOSP IP/OBS MODERATE 35: CPT | Performed by: INTERNAL MEDICINE

## 2025-07-27 PROCEDURE — 82948 REAGENT STRIP/BLOOD GLUCOSE: CPT

## 2025-07-27 RX ORDER — METOPROLOL SUCCINATE 50 MG/1
50 TABLET, EXTENDED RELEASE ORAL EVERY 12 HOURS
Qty: 60 TABLET | Refills: 0 | Status: SHIPPED | OUTPATIENT
Start: 2025-07-27 | End: 2025-07-27

## 2025-07-27 RX ORDER — NITROGLYCERIN 0.4 MG/1
0.4 TABLET SUBLINGUAL
Qty: 9 TABLET | Refills: 0 | Status: SHIPPED | OUTPATIENT
Start: 2025-07-27 | End: 2025-07-27

## 2025-07-27 RX ORDER — METOPROLOL SUCCINATE 50 MG/1
50 TABLET, EXTENDED RELEASE ORAL EVERY 12 HOURS
Qty: 60 TABLET | Refills: 0 | Status: SHIPPED | OUTPATIENT
Start: 2025-07-27

## 2025-07-27 RX ORDER — NITROGLYCERIN 0.4 MG/1
0.4 TABLET SUBLINGUAL
Qty: 9 TABLET | Refills: 0 | Status: SHIPPED | OUTPATIENT
Start: 2025-07-27

## 2025-07-27 RX ADMIN — INSULIN LISPRO 3 UNITS: 100 INJECTION, SOLUTION INTRAVENOUS; SUBCUTANEOUS at 11:34

## 2025-07-27 RX ADMIN — MYCOPHENOLATE MOFETIL 500 MG: 250 CAPSULE ORAL at 08:26

## 2025-07-27 RX ADMIN — CLOPIDOGREL 75 MG: 75 TABLET ORAL at 08:26

## 2025-07-27 RX ADMIN — APIXABAN 5 MG: 5 TABLET, FILM COATED ORAL at 08:26

## 2025-07-27 RX ADMIN — PREDNISONE 5 MG: 5 TABLET ORAL at 08:26

## 2025-07-27 RX ADMIN — METOPROLOL SUCCINATE 50 MG: 50 TABLET, EXTENDED RELEASE ORAL at 08:26

## 2025-07-27 RX ADMIN — SACUBITRIL AND VALSARTAN 1 TABLET: 24; 26 TABLET, FILM COATED ORAL at 08:26

## 2025-07-27 RX ADMIN — MULTIPLE VITAMINS W/ MINERALS TAB 1 TABLET: TAB ORAL at 08:26

## 2025-07-28 NOTE — TELEPHONE ENCOUNTER
Tried to give him a call just now but he didn't . If he calls back and has specific questions I'm happy to chat with him. Thank you

## 2025-07-28 NOTE — TELEPHONE ENCOUNTER
Can someone please reach out to pt he has questions about his eliquis. Thank you.       Patient scheduled for PCI on 8/21/25  at [facility] with Dr. JAUREGUI.      Instructions sent to patient through [employer].      7/28/25 Mailed patient instructions.     Patient aware of all general instructions.    Medication holds:     Jardiance - Do not take for 4 days before and morning of procedure.  (PT WIFE STATED HE IS NO LONGER TAKING)    Glipizide - Take your regular dose day/evening before procedure and do not take morning of procedure.      Eliquis - Do not take the night before and morning of procedure.     Furosemide (Lasix) - Do not take morning of procedure.       Labs to be done on 7/26/25  CMP / CBC (fasting 8 hours)

## 2025-07-31 ENCOUNTER — OFFICE VISIT (OUTPATIENT)
Dept: CARDIOLOGY CLINIC | Facility: CLINIC | Age: 70
End: 2025-07-31
Payer: COMMERCIAL

## 2025-07-31 VITALS
SYSTOLIC BLOOD PRESSURE: 118 MMHG | TEMPERATURE: 97.1 F | HEIGHT: 66 IN | HEART RATE: 74 BPM | DIASTOLIC BLOOD PRESSURE: 56 MMHG | BODY MASS INDEX: 20.89 KG/M2 | OXYGEN SATURATION: 95 % | WEIGHT: 130 LBS

## 2025-07-31 DIAGNOSIS — I25.10 CORONARY ARTERY DISEASE INVOLVING NATIVE CORONARY ARTERY OF NATIVE HEART WITHOUT ANGINA PECTORIS: ICD-10-CM

## 2025-07-31 DIAGNOSIS — Z95.1 HX OF CABG: ICD-10-CM

## 2025-07-31 DIAGNOSIS — I50.20 HFREF (HEART FAILURE WITH REDUCED EJECTION FRACTION) (HCC): ICD-10-CM

## 2025-07-31 DIAGNOSIS — I48.0 PAF (PAROXYSMAL ATRIAL FIBRILLATION) (HCC): ICD-10-CM

## 2025-07-31 DIAGNOSIS — I42.9 CARDIOMYOPATHY, UNSPECIFIED TYPE (HCC): ICD-10-CM

## 2025-07-31 DIAGNOSIS — I10 BENIGN ESSENTIAL HYPERTENSION: ICD-10-CM

## 2025-07-31 DIAGNOSIS — E78.5 DYSLIPIDEMIA: ICD-10-CM

## 2025-07-31 PROCEDURE — 99214 OFFICE O/P EST MOD 30 MIN: CPT

## 2025-07-31 PROCEDURE — 93000 ELECTROCARDIOGRAM COMPLETE: CPT

## 2025-07-31 RX ORDER — TRIAMCINOLONE ACETONIDE 1 MG/G
OINTMENT TOPICAL
COMMUNITY

## 2025-07-31 RX ORDER — KETOCONAZOLE 20 MG/G
CREAM TOPICAL
COMMUNITY

## 2025-07-31 RX ORDER — POTASSIUM CHLORIDE 750 MG/1
10 TABLET, EXTENDED RELEASE ORAL DAILY
COMMUNITY
Start: 2025-05-02

## 2025-07-31 RX ORDER — ESZOPICLONE 1 MG/1
TABLET, FILM COATED ORAL
COMMUNITY
Start: 2025-07-15

## 2025-07-31 RX ORDER — KETOCONAZOLE 20 MG/ML
SHAMPOO, SUSPENSION TOPICAL
COMMUNITY

## 2025-08-05 ENCOUNTER — HOSPITAL ENCOUNTER (OUTPATIENT)
Dept: PULMONOLOGY | Facility: HOSPITAL | Age: 70
Discharge: HOME | End: 2025-08-05
Attending: FAMILY MEDICINE
Payer: COMMERCIAL

## 2025-08-05 DIAGNOSIS — J84.10 PULMONARY FIBROSIS, UNSPECIFIED (HCC): ICD-10-CM

## 2025-08-05 PROCEDURE — 94060 EVALUATION OF WHEEZING: CPT

## 2025-08-05 PROCEDURE — 94060 EVALUATION OF WHEEZING: CPT | Performed by: INTERNAL MEDICINE

## 2025-08-05 PROCEDURE — 94726 PLETHYSMOGRAPHY LUNG VOLUMES: CPT | Performed by: INTERNAL MEDICINE

## 2025-08-05 PROCEDURE — 94760 N-INVAS EAR/PLS OXIMETRY 1: CPT

## 2025-08-05 PROCEDURE — 94729 DIFFUSING CAPACITY: CPT

## 2025-08-05 PROCEDURE — 94729 DIFFUSING CAPACITY: CPT | Performed by: INTERNAL MEDICINE

## 2025-08-05 PROCEDURE — 94726 PLETHYSMOGRAPHY LUNG VOLUMES: CPT

## 2025-08-05 RX ORDER — ALBUTEROL SULFATE 0.83 MG/ML
2.5 SOLUTION RESPIRATORY (INHALATION) ONCE
Status: COMPLETED | OUTPATIENT
Start: 2025-08-05 | End: 2025-08-05

## 2025-08-05 RX ADMIN — ALBUTEROL SULFATE 2.5 MG: 2.5 SOLUTION RESPIRATORY (INHALATION) at 12:42

## 2025-08-19 ENCOUNTER — OFFICE VISIT (OUTPATIENT)
Dept: NEPHROLOGY | Facility: CLINIC | Age: 70
End: 2025-08-19
Payer: COMMERCIAL

## 2025-08-19 VITALS — HEIGHT: 66 IN | BODY MASS INDEX: 22.18 KG/M2 | WEIGHT: 138 LBS

## 2025-08-19 DIAGNOSIS — R79.89 LOW SERUM CREATININE: ICD-10-CM

## 2025-08-19 DIAGNOSIS — I10 BENIGN ESSENTIAL HYPERTENSION: Primary | ICD-10-CM

## 2025-08-19 DIAGNOSIS — R80.8 OTHER PROTEINURIA: ICD-10-CM

## 2025-08-19 LAB
SL AMB  POCT GLUCOSE, UA: NORMAL
SL AMB LEUKOCYTE ESTERASE,UA: NORMAL
SL AMB POCT BILIRUBIN,UA: NORMAL
SL AMB POCT BLOOD,UA: NORMAL
SL AMB POCT KETONES,UA: NORMAL
SL AMB POCT NITRITE,UA: NORMAL
SL AMB POCT PH,UA: 7.5
SL AMB POCT SPECIFIC GRAVITY,UA: 1
SL AMB POCT URINE PROTEIN: NORMAL
SL AMB POCT UROBILINOGEN: 0.2

## 2025-08-19 PROCEDURE — 81002 URINALYSIS NONAUTO W/O SCOPE: CPT | Performed by: INTERNAL MEDICINE

## 2025-08-19 PROCEDURE — 99204 OFFICE O/P NEW MOD 45 MIN: CPT | Performed by: INTERNAL MEDICINE

## 2025-08-21 PROBLEM — I48.0 PAROXYSMAL ATRIAL FIBRILLATION (HCC): Status: ACTIVE | Noted: 2025-07-23

## 2025-08-21 PROBLEM — I47.20 VT (VENTRICULAR TACHYCARDIA) (HCC): Status: ACTIVE | Noted: 2025-08-21

## 2025-08-21 PROBLEM — I50.9 HEART FAILURE (HCC): Status: ACTIVE | Noted: 2025-08-21

## 2025-08-21 PROBLEM — I50.22 CHRONIC HFREF (HEART FAILURE WITH REDUCED EJECTION FRACTION) (HCC): Status: ACTIVE | Noted: 2025-07-23

## 2025-08-21 PROBLEM — I25.5 ISCHEMIC CARDIOMYOPATHY: Status: ACTIVE | Noted: 2025-07-17

## 2025-08-21 PROBLEM — R04.2 HEMOPTYSIS: Status: ACTIVE | Noted: 2025-08-21

## 2025-08-27 PROBLEM — Z95.810 BIVENTRICULAR ICD (IMPLANTABLE CARDIOVERTER-DEFIBRILLATOR) IN PLACE: Status: ACTIVE | Noted: 2025-08-27

## (undated) DEVICE — Device